# Patient Record
Sex: FEMALE | Race: WHITE | NOT HISPANIC OR LATINO | Employment: FULL TIME | URBAN - METROPOLITAN AREA
[De-identification: names, ages, dates, MRNs, and addresses within clinical notes are randomized per-mention and may not be internally consistent; named-entity substitution may affect disease eponyms.]

---

## 2017-04-10 RX ORDER — FLUOXETINE HYDROCHLORIDE 20 MG/1
60 CAPSULE ORAL DAILY
Qty: 90 CAPSULE | Refills: 0 | Status: SHIPPED | OUTPATIENT
Start: 2017-04-10 | End: 2017-09-27 | Stop reason: SDUPTHER

## 2017-09-27 ENCOUNTER — OFFICE VISIT (OUTPATIENT)
Dept: FAMILY MEDICINE | Facility: CLINIC | Age: 47
End: 2017-09-27
Payer: COMMERCIAL

## 2017-09-27 VITALS
HEIGHT: 64 IN | DIASTOLIC BLOOD PRESSURE: 69 MMHG | BODY MASS INDEX: 21 KG/M2 | SYSTOLIC BLOOD PRESSURE: 106 MMHG | WEIGHT: 123 LBS | HEART RATE: 70 BPM | TEMPERATURE: 98 F

## 2017-09-27 DIAGNOSIS — F32.A DEPRESSION, UNSPECIFIED DEPRESSION TYPE: Primary | ICD-10-CM

## 2017-09-27 DIAGNOSIS — F11.20 UNCOMPLICATED OPIOID DEPENDENCE: ICD-10-CM

## 2017-09-27 DIAGNOSIS — Z13.220 LIPID SCREENING: ICD-10-CM

## 2017-09-27 DIAGNOSIS — Z13.1 SCREENING FOR DIABETES MELLITUS: ICD-10-CM

## 2017-09-27 PROCEDURE — 3008F BODY MASS INDEX DOCD: CPT | Mod: S$GLB,,, | Performed by: FAMILY MEDICINE

## 2017-09-27 PROCEDURE — 99214 OFFICE O/P EST MOD 30 MIN: CPT | Mod: S$GLB,,, | Performed by: FAMILY MEDICINE

## 2017-09-27 PROCEDURE — 99999 PR PBB SHADOW E&M-EST. PATIENT-LVL III: CPT | Mod: PBBFAC,,, | Performed by: FAMILY MEDICINE

## 2017-09-27 RX ORDER — CARISOPRODOL 350 MG/1
350 TABLET ORAL DAILY
COMMUNITY
End: 2018-11-15

## 2017-09-27 RX ORDER — OXYCODONE HYDROCHLORIDE 60 MG/1
10 TABLET, FILM COATED, EXTENDED RELEASE ORAL 4 TIMES DAILY
COMMUNITY
End: 2018-11-15 | Stop reason: ALTCHOICE

## 2017-09-27 RX ORDER — ZOLPIDEM TARTRATE 10 MG/1
10 TABLET ORAL NIGHTLY PRN
COMMUNITY
End: 2020-05-25

## 2017-09-27 RX ORDER — FLUOXETINE HYDROCHLORIDE 20 MG/1
60 CAPSULE ORAL DAILY
Qty: 90 CAPSULE | Refills: 11 | Status: SHIPPED | OUTPATIENT
Start: 2017-09-27 | End: 2018-11-15 | Stop reason: SDUPTHER

## 2017-09-27 NOTE — PROGRESS NOTES
Followup on depression -request refill on Prozac. She has been on this for several years for depression symptoms. She has been doing well.. no suicidal or homicidal ideation. She does get some intermittent irritability and anxiety  around the time of her menstrual cycle, otherwise feels well.  Menstrual cycle seem to be spreading out, no intermenstrual bleeding.  Seeing GYN.  Some stress related to mother-in-law lives with them being ill.  She is on chronic pain medication from a pain management doctor due to neck problems, apparently starting to try to decrease medication.      Past Medical History:  Past Medical History:   Diagnosis Date    Allergy     Chronic neck pain     Depression     Kidney stone     Opioid dependence     Seen by pain management     Past Surgical History:   Procedure Laterality Date    LEEP       Social History     Social History    Marital status:      Spouse name: N/A    Number of children: N/A    Years of education: N/A     Occupational History    Not on file.     Social History Main Topics    Smoking status: Never Smoker    Smokeless tobacco: Not on file    Alcohol use 3.5 oz/week     7 drink(s) per week    Drug use: No    Sexual activity: Not on file     Other Topics Concern    Not on file     Social History Narrative    No narrative on file     No family history on file.  Allergies   Allergen Reactions    No Known Drug Allergies      Current Outpatient Prescriptions on File Prior to Visit   Medication Sig Dispense Refill    [DISCONTINUED] fluoxetine (PROZAC) 20 MG capsule Take 3 capsules (60 mg total) by mouth once daily. 90 capsule 0    [DISCONTINUED] carisoprodol (SOMA) 350 MG tablet Take 350 mg by mouth 4 (four) times daily as needed.       [DISCONTINUED] oxycodone (ROXICODONE) 30 MG immediate release tablet Take 30 mg by mouth every 6 (six) hours as needed. Three times a day      [DISCONTINUED] zolpidem (AMBIEN) 10 mg Tab Take 10 mg by mouth every  evening.        No current facility-administered medications on file prior to visit.            ROS:  GENERAL: No fever, chills,  or significant weight changes.  CHEST: Denies GLEASON, cyanosis, wheezing, cough and sputum production.  CARDIOVASCULAR: Denies chest pain, PND, orthopnea or reduced exercise tolerance.  ABDOMEN: Appetite fine. Denies diarrhea, abdominal pain, hematemesis or blood in stool.  PSYCHIATRIC: As above.  No SI/HI          OBJECTIVE:   Vitals:    09/27/17 1302   BP: 106/69   Pulse: 70   Temp: 97.9 °F (36.6 °C)     Wt Readings from Last 3 Encounters:   09/27/17 55.8 kg (123 lb)   04/01/16 58.2 kg (128 lb 6.4 oz)   01/30/15 58.5 kg (129 lb)         APPEARANCE: Well nourished, well developed, in no acute distress.    HEAD: Normocephalic.  Atraumatic.  No sinus tenderness.  EYES:   Right eye: Pupil reactive.  Conjunctiva clear.    Left eye: Pupil reactive.  Conjunctiva clear.    Both fundi:  Grossly normal to nondilated exam. EOMI.    EARS: TM's intact. Light reflex normal. No retraction or perforation.    NOSE:  clear.  MOUTH & THROAT:  No pharyngeal erythema or exudate. No lesions.  NECK: Supple. No bruits.  No JVD.  No cervical lymphadenopathy.  No thyromegaly.    CHEST: Breath sounds clear bilaterally.  Normal respiratory effort  CARDIOVASCULAR: Normal rate.  Regular rhythm.  No murmurs.  No rub.  No gallops.  ABDOMEN: Bowel sounds normal.  Soft.  No tenderness.  No organomegaly.  PERIPHERAL VASCULAR: No cyanosis.  No clubbing.  No edema.  NEUROLOGIC: No focal findings.  MENTAL STATUS: Alert.  Oriented x 3.      Kya was seen today for medication refill and anxiety.    Diagnoses and all orders for this visit:    Depression, unspecified depression type    Uncomplicated opioid dependence    Lipid screening  -     Cancel: Lipid panel; Future  -     Lipid panel; Future    Screening for diabetes mellitus  -     Cancel: Glucose, fasting; Future  -     Glucose, fasting; Future    Other orders  -      fluoxetine (PROZAC) 20 MG capsule; Take 3 capsules (60 mg total) by mouth once daily.      Continue current medication.  Keep follow-up pain management.  Would encourage decrease in opioid use.  Request report from last Pap smear and mammogram

## 2018-06-22 DIAGNOSIS — Z12.39 BREAST CANCER SCREENING: ICD-10-CM

## 2018-11-09 ENCOUNTER — TELEPHONE (OUTPATIENT)
Dept: FAMILY MEDICINE | Facility: CLINIC | Age: 48
End: 2018-11-09

## 2018-11-09 NOTE — TELEPHONE ENCOUNTER
Patient informed annual due, last seen 09/2017, appt scheduled, she will get records she needs when she comes in

## 2018-11-09 NOTE — TELEPHONE ENCOUNTER
----- Message from Jarrod Hopper sent at 11/9/2018 10:59 AM CST -----  Contact: self [phone]  Would like to consult with nurse regarding referral for neurology.  Please call back at [phone].  Africa Rodriguez

## 2018-11-15 ENCOUNTER — OFFICE VISIT (OUTPATIENT)
Dept: FAMILY MEDICINE | Facility: CLINIC | Age: 48
End: 2018-11-15
Payer: COMMERCIAL

## 2018-11-15 VITALS
WEIGHT: 126.81 LBS | HEIGHT: 64 IN | DIASTOLIC BLOOD PRESSURE: 80 MMHG | BODY MASS INDEX: 21.65 KG/M2 | TEMPERATURE: 98 F | SYSTOLIC BLOOD PRESSURE: 120 MMHG | HEART RATE: 80 BPM

## 2018-11-15 DIAGNOSIS — Z00.00 ROUTINE HISTORY AND PHYSICAL EXAMINATION OF ADULT: Primary | ICD-10-CM

## 2018-11-15 DIAGNOSIS — G89.29 CHRONIC NECK PAIN: ICD-10-CM

## 2018-11-15 DIAGNOSIS — M54.42 CHRONIC LOW BACK PAIN WITH LEFT-SIDED SCIATICA, UNSPECIFIED BACK PAIN LATERALITY: ICD-10-CM

## 2018-11-15 DIAGNOSIS — Z79.891 CHRONICALLY ON OPIATE THERAPY: ICD-10-CM

## 2018-11-15 DIAGNOSIS — M54.2 CHRONIC NECK PAIN: ICD-10-CM

## 2018-11-15 DIAGNOSIS — G89.29 CHRONIC LOW BACK PAIN WITH LEFT-SIDED SCIATICA, UNSPECIFIED BACK PAIN LATERALITY: ICD-10-CM

## 2018-11-15 DIAGNOSIS — F33.42 RECURRENT MAJOR DEPRESSIVE DISORDER, IN FULL REMISSION: ICD-10-CM

## 2018-11-15 PROBLEM — M54.9 CHRONIC BACK PAIN: Status: ACTIVE | Noted: 2018-11-15

## 2018-11-15 PROCEDURE — 99999 PR PBB SHADOW E&M-EST. PATIENT-LVL III: CPT | Mod: PBBFAC,,, | Performed by: FAMILY MEDICINE

## 2018-11-15 PROCEDURE — 99396 PREV VISIT EST AGE 40-64: CPT | Mod: S$GLB,,, | Performed by: FAMILY MEDICINE

## 2018-11-15 RX ORDER — OXYCODONE AND ACETAMINOPHEN 10; 325 MG/1; MG/1
1 TABLET ORAL 3 TIMES DAILY PRN
COMMUNITY
End: 2021-08-23

## 2018-11-15 RX ORDER — FLUOXETINE HYDROCHLORIDE 20 MG/1
60 CAPSULE ORAL DAILY
Qty: 90 CAPSULE | Refills: 11 | Status: SHIPPED | OUTPATIENT
Start: 2018-11-15 | End: 2020-01-14

## 2018-11-15 RX ORDER — OXYCODONE HCL 10 MG/1
10 TABLET, FILM COATED, EXTENDED RELEASE ORAL EVERY 12 HOURS PRN
COMMUNITY
End: 2020-05-25

## 2018-11-15 RX ORDER — ESTRADIOL 1 MG/1
1 TABLET ORAL DAILY
Refills: 6 | COMMUNITY
Start: 2018-10-12

## 2018-11-15 RX ORDER — MEDROXYPROGESTERONE ACETATE 2.5 MG/1
2.5 TABLET ORAL DAILY
COMMUNITY

## 2018-11-16 ENCOUNTER — TELEPHONE (OUTPATIENT)
Dept: FAMILY MEDICINE | Facility: CLINIC | Age: 48
End: 2018-11-16

## 2018-11-16 NOTE — TELEPHONE ENCOUNTER
----- Message from Jarrod Hopper sent at 11/15/2018  4:46 PM CST -----  Contact: self [phone]  Returning call, please call back at [phone].  Md [place]

## 2019-08-23 ENCOUNTER — PATIENT OUTREACH (OUTPATIENT)
Dept: ADMINISTRATIVE | Facility: HOSPITAL | Age: 49
End: 2019-08-23

## 2019-09-06 ENCOUNTER — PATIENT OUTREACH (OUTPATIENT)
Dept: ADMINISTRATIVE | Facility: HOSPITAL | Age: 49
End: 2019-09-06

## 2019-10-11 ENCOUNTER — PATIENT OUTREACH (OUTPATIENT)
Dept: ADMINISTRATIVE | Facility: HOSPITAL | Age: 49
End: 2019-10-11

## 2019-10-16 DIAGNOSIS — Z12.39 BREAST CANCER SCREENING: ICD-10-CM

## 2020-01-14 RX ORDER — FLUOXETINE HYDROCHLORIDE 20 MG/1
60 CAPSULE ORAL DAILY
Qty: 270 CAPSULE | Refills: 0 | Status: SHIPPED | OUTPATIENT
Start: 2020-01-14 | End: 2020-05-18

## 2020-05-18 RX ORDER — FLUOXETINE HYDROCHLORIDE 20 MG/1
CAPSULE ORAL
Qty: 270 CAPSULE | Refills: 0 | Status: SHIPPED | OUTPATIENT
Start: 2020-05-18 | End: 2020-05-25 | Stop reason: SDUPTHER

## 2020-05-25 ENCOUNTER — OFFICE VISIT (OUTPATIENT)
Dept: FAMILY MEDICINE | Facility: CLINIC | Age: 50
End: 2020-05-25
Payer: COMMERCIAL

## 2020-05-25 VITALS
SYSTOLIC BLOOD PRESSURE: 101 MMHG | BODY MASS INDEX: 22.66 KG/M2 | DIASTOLIC BLOOD PRESSURE: 62 MMHG | TEMPERATURE: 98 F | HEART RATE: 71 BPM | WEIGHT: 132 LBS

## 2020-05-25 DIAGNOSIS — Z11.4 ENCOUNTER FOR SCREENING FOR HIV: ICD-10-CM

## 2020-05-25 DIAGNOSIS — Z00.00 ROUTINE HISTORY AND PHYSICAL EXAMINATION OF ADULT: Primary | ICD-10-CM

## 2020-05-25 DIAGNOSIS — F33.42 RECURRENT MAJOR DEPRESSIVE DISORDER, IN FULL REMISSION: ICD-10-CM

## 2020-05-25 PROCEDURE — 99396 PR PREVENTIVE VISIT,EST,40-64: ICD-10-PCS | Mod: S$GLB,,, | Performed by: FAMILY MEDICINE

## 2020-05-25 PROCEDURE — 99396 PREV VISIT EST AGE 40-64: CPT | Mod: S$GLB,,, | Performed by: FAMILY MEDICINE

## 2020-05-25 PROCEDURE — 99999 PR PBB SHADOW E&M-EST. PATIENT-LVL III: CPT | Mod: PBBFAC,,, | Performed by: FAMILY MEDICINE

## 2020-05-25 PROCEDURE — 99999 PR PBB SHADOW E&M-EST. PATIENT-LVL III: ICD-10-PCS | Mod: PBBFAC,,, | Performed by: FAMILY MEDICINE

## 2020-05-25 RX ORDER — FLUOXETINE HYDROCHLORIDE 20 MG/1
60 CAPSULE ORAL DAILY
Qty: 270 CAPSULE | Refills: 3 | Status: SHIPPED | OUTPATIENT
Start: 2020-05-25 | End: 2021-07-23

## 2020-05-25 RX ORDER — CYCLOBENZAPRINE HCL 10 MG
TABLET ORAL
COMMUNITY
Start: 2020-04-20

## 2020-05-25 RX ORDER — OXYCODONE 13.5 MG/1
CAPSULE, EXTENDED RELEASE ORAL 2 TIMES DAILY
COMMUNITY
Start: 2020-05-18 | End: 2021-08-23

## 2020-05-25 RX ORDER — CELECOXIB 200 MG/1
CAPSULE ORAL
COMMUNITY
Start: 2020-04-20 | End: 2021-11-05

## 2020-05-26 NOTE — PROGRESS NOTES
Physical exam, Followup on depression -request refill on Prozac. She has been on this for several years for depression symptoms. She has been doing well.. no suicidal or homicidal ideation.  She is on hormone replacement therapy by her gynecologist.  States Pap smear up-to-date, mammogram up-to-date.   She is on chronic pain medication from a pain management doctor due to neck and back problems..      Past Medical History:  Past Medical History:   Diagnosis Date    Allergy     Chronic back pain 11/15/2018    Chronic neck pain     Chronically on opiate therapy     Depression     Kidney stone      Past Surgical History:   Procedure Laterality Date    LEEP       Social History     Socioeconomic History    Marital status:      Spouse name: Not on file    Number of children: Not on file    Years of education: Not on file    Highest education level: Not on file   Occupational History    Not on file   Social Needs    Financial resource strain: Not on file    Food insecurity:     Worry: Not on file     Inability: Not on file    Transportation needs:     Medical: Not on file     Non-medical: Not on file   Tobacco Use    Smoking status: Never Smoker   Substance and Sexual Activity    Alcohol use: Yes     Alcohol/week: 5.8 standard drinks     Types: 7 drink(s) per week    Drug use: No    Sexual activity: Not on file   Lifestyle    Physical activity:     Days per week: Not on file     Minutes per session: Not on file    Stress: Not on file   Relationships    Social connections:     Talks on phone: Not on file     Gets together: Not on file     Attends Tenriism service: Not on file     Active member of club or organization: Not on file     Attends meetings of clubs or organizations: Not on file     Relationship status: Not on file   Other Topics Concern    Not on file   Social History Narrative    Not on file     History reviewed. No pertinent family history.  Allergies   Allergen Reactions    No  Known Drug Allergies      Current Outpatient Medications on File Prior to Visit   Medication Sig Dispense Refill    estradiol (ESTRACE) 1 MG tablet Take 1 mg by mouth once daily.  6    medroxyPROGESTERone (PROVERA) 2.5 MG tablet Take 2.5 mg by mouth once daily.      oxyCODONE-acetaminophen (PERCOCET)  mg per tablet Take 1 tablet by mouth 3 (three) times daily as needed.      XTAMPZA ER 13.5 mg CSpT Take by mouth 2 (two) times daily.       [DISCONTINUED] FLUoxetine 20 MG capsule TAKE 3 CAPSULES BY MOUTH ONCE DAILY. 270 capsule 0    celecoxib (CELEBREX) 200 MG capsule       cyclobenzaprine (FLEXERIL) 10 MG tablet       [DISCONTINUED] oxyCODONE (OXYCONTIN) 10 mg 12 hr tablet Take 10 mg by mouth every 12 (twelve) hours as needed.      [DISCONTINUED] zolpidem (AMBIEN) 10 mg Tab Take 10 mg by mouth nightly as needed.       No current facility-administered medications on file prior to visit.            ROS:  GENERAL: No fever, chills,  or significant weight changes.  HEENT: No headache or hearing complaints.  No dysphagia  Eyes: No vision complaints  CHEST: Denies GLEASON, cyanosis, wheezing, cough and sputum production.  CARDIOVASCULAR: Denies chest pain, PND, orthopnea or reduced exercise tolerance.  ABDOMEN: Appetite fine. Denies diarrhea, abdominal pain, hematemesis or blood in stool.  URINARY: No flank pain, dysuria or hematuria.  MUSCULOSKELETAL: No warmth swelling or tenderness of the joints.  She does get some neck and back pain.  Occasional radiation to the leg.  NEUROLOGIC: No focal weakness.  PSYCHIATRIC: As above.  No SI/HI        OBJECTIVE:     Vitals:    05/25/20 0926   BP: 101/62   Pulse: 71   Temp: 98.3 °F (36.8 °C)   Weight: 59.9 kg (132 lb)     Wt Readings from Last 3 Encounters:   05/25/20 59.9 kg (132 lb)   11/15/18 57.5 kg (126 lb 12.8 oz)   09/27/17 55.8 kg (123 lb)     APPEARANCE: Well nourished, well developed, in no acute distress.    HEAD: Normocephalic.  Atraumatic.  No sinus  tenderness.  EYES:   Right eye: Pupil reactive.  Conjunctiva clear.    Left eye: Pupil reactive.  Conjunctiva clear.    Both fundi:  Grossly normal to nondilated exam. EOMI.    EARS: TM's intact. Light reflex normal. No retraction or perforation.    NOSE:  clear.  MOUTH & THROAT:  No pharyngeal erythema or exudate. No lesions.  NECK: Supple. No bruits.  No JVD.  No cervical lymphadenopathy.  No thyromegaly.    CHEST: Breath sounds clear bilaterally.  Normal respiratory effort  CARDIOVASCULAR: Normal rate.  Regular rhythm.  No murmurs.  No rub.  No gallops.  ABDOMEN: Bowel sounds normal.  Soft.  No tenderness.  No organomegaly.  PERIPHERAL VASCULAR: No cyanosis.  No clubbing.  No edema.  NEUROLOGIC: No focal findings.  MENTAL STATUS: Alert.  Oriented x 3.        Kya was seen today for annual exam.    Diagnoses and all orders for this visit:    Routine history and physical examination of adult  -     CBC auto differential; Future  -     Lipid Panel; Future  -     Comprehensive metabolic panel; Future  -     CBC auto differential  -     Lipid Panel  -     Comprehensive metabolic panel  -     Cancel: HIV 1/2 Ag/Ab (4th Gen); Future  -     HIV 1/2 Ag/Ab (4th Gen); Future  -     HIV 1/2 Ag/Ab (4th Gen)    Recurrent major depressive disorder, in full remission    Encounter for screening for HIV  -     Cancel: HIV 1/2 Ag/Ab (4th Gen); Future  -     HIV 1/2 Ag/Ab (4th Gen); Future  -     HIV 1/2 Ag/Ab (4th Gen)    Other orders  -     FLUoxetine 20 MG capsule; Take 3 capsules (60 mg total) by mouth once daily.      Continue current medication.  Request report from last Pap smear and mammogram.      Anticipatory guidance: Don't smoke.  Healthy diet and regular exercise recommended.

## 2020-12-16 DIAGNOSIS — Z12.31 OTHER SCREENING MAMMOGRAM: ICD-10-CM

## 2021-05-28 ENCOUNTER — TELEPHONE (OUTPATIENT)
Dept: FAMILY MEDICINE | Facility: CLINIC | Age: 51
End: 2021-05-28

## 2021-05-31 ENCOUNTER — TELEPHONE (OUTPATIENT)
Dept: FAMILY MEDICINE | Facility: CLINIC | Age: 51
End: 2021-05-31

## 2021-07-23 RX ORDER — FLUOXETINE HYDROCHLORIDE 20 MG/1
CAPSULE ORAL
Qty: 270 CAPSULE | Refills: 0 | Status: SHIPPED | OUTPATIENT
Start: 2021-07-23 | End: 2021-08-23 | Stop reason: SDUPTHER

## 2021-08-13 ENCOUNTER — TELEPHONE (OUTPATIENT)
Dept: FAMILY MEDICINE | Facility: CLINIC | Age: 51
End: 2021-08-13

## 2021-08-23 ENCOUNTER — OFFICE VISIT (OUTPATIENT)
Dept: FAMILY MEDICINE | Facility: CLINIC | Age: 51
End: 2021-08-23
Payer: COMMERCIAL

## 2021-08-23 VITALS
HEIGHT: 64 IN | SYSTOLIC BLOOD PRESSURE: 103 MMHG | BODY MASS INDEX: 22.02 KG/M2 | WEIGHT: 129 LBS | HEART RATE: 65 BPM | OXYGEN SATURATION: 96 % | TEMPERATURE: 98 F | DIASTOLIC BLOOD PRESSURE: 72 MMHG

## 2021-08-23 DIAGNOSIS — F33.42 RECURRENT MAJOR DEPRESSIVE DISORDER, IN FULL REMISSION: ICD-10-CM

## 2021-08-23 DIAGNOSIS — Z00.00 ROUTINE HISTORY AND PHYSICAL EXAMINATION OF ADULT: Primary | ICD-10-CM

## 2021-08-23 DIAGNOSIS — Z11.59 NEED FOR HEPATITIS C SCREENING TEST: ICD-10-CM

## 2021-08-23 DIAGNOSIS — Z11.4 SCREENING FOR HIV (HUMAN IMMUNODEFICIENCY VIRUS): ICD-10-CM

## 2021-08-23 DIAGNOSIS — Z12.11 SCREENING FOR COLON CANCER: ICD-10-CM

## 2021-08-23 PROCEDURE — 1126F AMNT PAIN NOTED NONE PRSNT: CPT | Mod: CPTII,S$GLB,, | Performed by: FAMILY MEDICINE

## 2021-08-23 PROCEDURE — 1159F MED LIST DOCD IN RCRD: CPT | Mod: CPTII,S$GLB,, | Performed by: FAMILY MEDICINE

## 2021-08-23 PROCEDURE — 1159F PR MEDICATION LIST DOCUMENTED IN MEDICAL RECORD: ICD-10-PCS | Mod: CPTII,S$GLB,, | Performed by: FAMILY MEDICINE

## 2021-08-23 PROCEDURE — 3078F PR MOST RECENT DIASTOLIC BLOOD PRESSURE < 80 MM HG: ICD-10-PCS | Mod: CPTII,S$GLB,, | Performed by: FAMILY MEDICINE

## 2021-08-23 PROCEDURE — 90750 HZV VACC RECOMBINANT IM: CPT | Mod: S$GLB,,, | Performed by: FAMILY MEDICINE

## 2021-08-23 PROCEDURE — 90471 ZOSTER RECOMBINANT VACCINE: ICD-10-PCS | Mod: S$GLB,,, | Performed by: FAMILY MEDICINE

## 2021-08-23 PROCEDURE — 99999 PR PBB SHADOW E&M-EST. PATIENT-LVL III: ICD-10-PCS | Mod: PBBFAC,,, | Performed by: FAMILY MEDICINE

## 2021-08-23 PROCEDURE — 99396 PREV VISIT EST AGE 40-64: CPT | Mod: 25,S$GLB,, | Performed by: FAMILY MEDICINE

## 2021-08-23 PROCEDURE — 3074F PR MOST RECENT SYSTOLIC BLOOD PRESSURE < 130 MM HG: ICD-10-PCS | Mod: CPTII,S$GLB,, | Performed by: FAMILY MEDICINE

## 2021-08-23 PROCEDURE — 99396 PR PREVENTIVE VISIT,EST,40-64: ICD-10-PCS | Mod: 25,S$GLB,, | Performed by: FAMILY MEDICINE

## 2021-08-23 PROCEDURE — 90471 IMMUNIZATION ADMIN: CPT | Mod: S$GLB,,, | Performed by: FAMILY MEDICINE

## 2021-08-23 PROCEDURE — 3008F PR BODY MASS INDEX (BMI) DOCUMENTED: ICD-10-PCS | Mod: CPTII,S$GLB,, | Performed by: FAMILY MEDICINE

## 2021-08-23 PROCEDURE — 3074F SYST BP LT 130 MM HG: CPT | Mod: CPTII,S$GLB,, | Performed by: FAMILY MEDICINE

## 2021-08-23 PROCEDURE — 99999 PR PBB SHADOW E&M-EST. PATIENT-LVL III: CPT | Mod: PBBFAC,,, | Performed by: FAMILY MEDICINE

## 2021-08-23 PROCEDURE — 1126F PR PAIN SEVERITY QUANTIFIED, NO PAIN PRESENT: ICD-10-PCS | Mod: CPTII,S$GLB,, | Performed by: FAMILY MEDICINE

## 2021-08-23 PROCEDURE — 3008F BODY MASS INDEX DOCD: CPT | Mod: CPTII,S$GLB,, | Performed by: FAMILY MEDICINE

## 2021-08-23 PROCEDURE — 3078F DIAST BP <80 MM HG: CPT | Mod: CPTII,S$GLB,, | Performed by: FAMILY MEDICINE

## 2021-08-23 PROCEDURE — 90750 ZOSTER RECOMBINANT VACCINE: ICD-10-PCS | Mod: S$GLB,,, | Performed by: FAMILY MEDICINE

## 2021-08-23 RX ORDER — ZOLPIDEM TARTRATE 10 MG/1
10 TABLET ORAL NIGHTLY
COMMUNITY
Start: 2021-07-19 | End: 2021-08-23 | Stop reason: SDUPTHER

## 2021-08-23 RX ORDER — ZOLPIDEM TARTRATE 5 MG/1
5 TABLET ORAL NIGHTLY PRN
Qty: 30 TABLET | Refills: 5 | Status: SHIPPED | OUTPATIENT
Start: 2021-08-23 | End: 2022-02-08 | Stop reason: SDUPTHER

## 2021-08-23 RX ORDER — OXYCODONE HYDROCHLORIDE 20 MG/1
TABLET, FILM COATED, EXTENDED RELEASE ORAL
COMMUNITY
Start: 2021-05-13 | End: 2021-08-23

## 2021-08-23 RX ORDER — FLUOXETINE HYDROCHLORIDE 20 MG/1
60 CAPSULE ORAL DAILY
Qty: 270 CAPSULE | Refills: 3 | Status: SHIPPED | OUTPATIENT
Start: 2021-08-23 | End: 2022-09-24

## 2021-09-30 ENCOUNTER — PATIENT OUTREACH (OUTPATIENT)
Dept: ADMINISTRATIVE | Facility: HOSPITAL | Age: 51
End: 2021-09-30

## 2021-10-14 ENCOUNTER — PATIENT OUTREACH (OUTPATIENT)
Dept: ADMINISTRATIVE | Facility: HOSPITAL | Age: 51
End: 2021-10-14

## 2021-10-25 ENCOUNTER — TELEPHONE (OUTPATIENT)
Dept: FAMILY MEDICINE | Facility: CLINIC | Age: 51
End: 2021-10-25
Payer: COMMERCIAL

## 2021-10-29 ENCOUNTER — TELEPHONE (OUTPATIENT)
Dept: FAMILY MEDICINE | Facility: CLINIC | Age: 51
End: 2021-10-29

## 2021-10-29 ENCOUNTER — CLINICAL SUPPORT (OUTPATIENT)
Dept: FAMILY MEDICINE | Facility: CLINIC | Age: 51
End: 2021-10-29
Payer: COMMERCIAL

## 2021-10-29 DIAGNOSIS — Z23 IMMUNIZATION DUE: Primary | ICD-10-CM

## 2021-10-29 PROCEDURE — 99999 PR PBB SHADOW E&M-EST. PATIENT-LVL I: CPT | Mod: PBBFAC,,,

## 2021-10-29 PROCEDURE — 90471 ZOSTER RECOMBINANT VACCINE: ICD-10-PCS | Mod: S$GLB,,, | Performed by: FAMILY MEDICINE

## 2021-10-29 PROCEDURE — 90471 IMMUNIZATION ADMIN: CPT | Mod: S$GLB,,, | Performed by: FAMILY MEDICINE

## 2021-10-29 PROCEDURE — 90750 HZV VACC RECOMBINANT IM: CPT | Mod: S$GLB,,, | Performed by: FAMILY MEDICINE

## 2021-10-29 PROCEDURE — 90750 ZOSTER RECOMBINANT VACCINE: ICD-10-PCS | Mod: S$GLB,,, | Performed by: FAMILY MEDICINE

## 2021-10-29 PROCEDURE — 99999 PR PBB SHADOW E&M-EST. PATIENT-LVL I: ICD-10-PCS | Mod: PBBFAC,,,

## 2021-11-05 ENCOUNTER — OFFICE VISIT (OUTPATIENT)
Dept: FAMILY MEDICINE | Facility: CLINIC | Age: 51
End: 2021-11-05
Payer: COMMERCIAL

## 2021-11-05 VITALS
TEMPERATURE: 98 F | HEIGHT: 65 IN | SYSTOLIC BLOOD PRESSURE: 111 MMHG | BODY MASS INDEX: 21.49 KG/M2 | HEART RATE: 72 BPM | WEIGHT: 129 LBS | DIASTOLIC BLOOD PRESSURE: 69 MMHG

## 2021-11-05 DIAGNOSIS — Z11.59 NEED FOR HEPATITIS C SCREENING TEST: ICD-10-CM

## 2021-11-05 DIAGNOSIS — Z11.4 SCREENING FOR HIV (HUMAN IMMUNODEFICIENCY VIRUS): ICD-10-CM

## 2021-11-05 DIAGNOSIS — L65.9 HAIR LOSS: Primary | ICD-10-CM

## 2021-11-05 DIAGNOSIS — F33.42 RECURRENT MAJOR DEPRESSIVE DISORDER, IN FULL REMISSION: ICD-10-CM

## 2021-11-05 DIAGNOSIS — Z00.00 ROUTINE HISTORY AND PHYSICAL EXAMINATION OF ADULT: ICD-10-CM

## 2021-11-05 PROCEDURE — 3074F PR MOST RECENT SYSTOLIC BLOOD PRESSURE < 130 MM HG: ICD-10-PCS | Mod: CPTII,S$GLB,, | Performed by: FAMILY MEDICINE

## 2021-11-05 PROCEDURE — 99214 PR OFFICE/OUTPT VISIT, EST, LEVL IV, 30-39 MIN: ICD-10-PCS | Mod: S$GLB,,, | Performed by: FAMILY MEDICINE

## 2021-11-05 PROCEDURE — 3008F PR BODY MASS INDEX (BMI) DOCUMENTED: ICD-10-PCS | Mod: CPTII,S$GLB,, | Performed by: FAMILY MEDICINE

## 2021-11-05 PROCEDURE — 99214 OFFICE O/P EST MOD 30 MIN: CPT | Mod: S$GLB,,, | Performed by: FAMILY MEDICINE

## 2021-11-05 PROCEDURE — 3008F BODY MASS INDEX DOCD: CPT | Mod: CPTII,S$GLB,, | Performed by: FAMILY MEDICINE

## 2021-11-05 PROCEDURE — 3078F DIAST BP <80 MM HG: CPT | Mod: CPTII,S$GLB,, | Performed by: FAMILY MEDICINE

## 2021-11-05 PROCEDURE — 1159F PR MEDICATION LIST DOCUMENTED IN MEDICAL RECORD: ICD-10-PCS | Mod: CPTII,S$GLB,, | Performed by: FAMILY MEDICINE

## 2021-11-05 PROCEDURE — 3078F PR MOST RECENT DIASTOLIC BLOOD PRESSURE < 80 MM HG: ICD-10-PCS | Mod: CPTII,S$GLB,, | Performed by: FAMILY MEDICINE

## 2021-11-05 PROCEDURE — 1159F MED LIST DOCD IN RCRD: CPT | Mod: CPTII,S$GLB,, | Performed by: FAMILY MEDICINE

## 2021-11-05 PROCEDURE — 99999 PR PBB SHADOW E&M-EST. PATIENT-LVL III: ICD-10-PCS | Mod: PBBFAC,,, | Performed by: FAMILY MEDICINE

## 2021-11-05 PROCEDURE — 3074F SYST BP LT 130 MM HG: CPT | Mod: CPTII,S$GLB,, | Performed by: FAMILY MEDICINE

## 2021-11-05 PROCEDURE — 99999 PR PBB SHADOW E&M-EST. PATIENT-LVL III: CPT | Mod: PBBFAC,,, | Performed by: FAMILY MEDICINE

## 2021-11-05 RX ORDER — FLUCONAZOLE 150 MG/1
150 TABLET ORAL ONCE
Qty: 1 TABLET | Refills: 0 | Status: SHIPPED | OUTPATIENT
Start: 2021-11-05 | End: 2021-11-05

## 2021-11-06 ENCOUNTER — LAB VISIT (OUTPATIENT)
Dept: LAB | Facility: HOSPITAL | Age: 51
End: 2021-11-06
Attending: FAMILY MEDICINE
Payer: COMMERCIAL

## 2021-11-06 DIAGNOSIS — Z11.59 NEED FOR HEPATITIS C SCREENING TEST: ICD-10-CM

## 2021-11-06 DIAGNOSIS — Z11.4 SCREENING FOR HIV (HUMAN IMMUNODEFICIENCY VIRUS): ICD-10-CM

## 2021-11-06 DIAGNOSIS — L65.9 HAIR LOSS: ICD-10-CM

## 2021-11-06 DIAGNOSIS — Z00.00 ROUTINE HISTORY AND PHYSICAL EXAMINATION OF ADULT: ICD-10-CM

## 2021-11-06 LAB
ALBUMIN SERPL BCP-MCNC: 3.8 G/DL (ref 3.5–5.2)
ALP SERPL-CCNC: 78 U/L (ref 55–135)
ALT SERPL W/O P-5'-P-CCNC: 15 U/L (ref 10–44)
ANION GAP SERPL CALC-SCNC: 8 MMOL/L (ref 8–16)
AST SERPL-CCNC: 24 U/L (ref 10–40)
BASOPHILS # BLD AUTO: 0.03 K/UL (ref 0–0.2)
BASOPHILS NFR BLD: 0.5 % (ref 0–1.9)
BILIRUB SERPL-MCNC: 0.7 MG/DL (ref 0.1–1)
BUN SERPL-MCNC: 8 MG/DL (ref 6–20)
CALCIUM SERPL-MCNC: 9.6 MG/DL (ref 8.7–10.5)
CHLORIDE SERPL-SCNC: 104 MMOL/L (ref 95–110)
CHOLEST SERPL-MCNC: 152 MG/DL (ref 120–199)
CHOLEST/HDLC SERPL: 2.3 {RATIO} (ref 2–5)
CO2 SERPL-SCNC: 30 MMOL/L (ref 23–29)
CREAT SERPL-MCNC: 0.8 MG/DL (ref 0.5–1.4)
DIFFERENTIAL METHOD: NORMAL
EOSINOPHIL # BLD AUTO: 0.2 K/UL (ref 0–0.5)
EOSINOPHIL NFR BLD: 3.1 % (ref 0–8)
ERYTHROCYTE [DISTWIDTH] IN BLOOD BY AUTOMATED COUNT: 13 % (ref 11.5–14.5)
EST. GFR  (AFRICAN AMERICAN): >60 ML/MIN/1.73 M^2
EST. GFR  (NON AFRICAN AMERICAN): >60 ML/MIN/1.73 M^2
GLUCOSE SERPL-MCNC: 88 MG/DL (ref 70–110)
HCT VFR BLD AUTO: 39.2 % (ref 37–48.5)
HDLC SERPL-MCNC: 67 MG/DL (ref 40–75)
HDLC SERPL: 44.1 % (ref 20–50)
HGB BLD-MCNC: 12.7 G/DL (ref 12–16)
IMM GRANULOCYTES # BLD AUTO: 0.02 K/UL (ref 0–0.04)
IMM GRANULOCYTES NFR BLD AUTO: 0.4 % (ref 0–0.5)
LDLC SERPL CALC-MCNC: 77 MG/DL (ref 63–159)
LYMPHOCYTES # BLD AUTO: 1.5 K/UL (ref 1–4.8)
LYMPHOCYTES NFR BLD: 26.6 % (ref 18–48)
MCH RBC QN AUTO: 30.2 PG (ref 27–31)
MCHC RBC AUTO-ENTMCNC: 32.4 G/DL (ref 32–36)
MCV RBC AUTO: 93 FL (ref 82–98)
MONOCYTES # BLD AUTO: 0.4 K/UL (ref 0.3–1)
MONOCYTES NFR BLD: 6.6 % (ref 4–15)
NEUTROPHILS # BLD AUTO: 3.5 K/UL (ref 1.8–7.7)
NEUTROPHILS NFR BLD: 62.8 % (ref 38–73)
NONHDLC SERPL-MCNC: 85 MG/DL
NRBC BLD-RTO: 0 /100 WBC
PLATELET # BLD AUTO: 306 K/UL (ref 150–450)
PMV BLD AUTO: 9.3 FL (ref 9.2–12.9)
POTASSIUM SERPL-SCNC: 4.2 MMOL/L (ref 3.5–5.1)
PROT SERPL-MCNC: 7.2 G/DL (ref 6–8.4)
RBC # BLD AUTO: 4.21 M/UL (ref 4–5.4)
SODIUM SERPL-SCNC: 142 MMOL/L (ref 136–145)
TRIGL SERPL-MCNC: 40 MG/DL (ref 30–150)
TSH SERPL DL<=0.005 MIU/L-ACNC: 1.6 UIU/ML (ref 0.4–4)
VIT B12 SERPL-MCNC: 635 PG/ML (ref 210–950)
WBC # BLD AUTO: 5.57 K/UL (ref 3.9–12.7)

## 2021-11-06 PROCEDURE — 82607 VITAMIN B-12: CPT | Performed by: FAMILY MEDICINE

## 2021-11-06 PROCEDURE — 84443 ASSAY THYROID STIM HORMONE: CPT | Performed by: FAMILY MEDICINE

## 2021-11-06 PROCEDURE — 87389 HIV-1 AG W/HIV-1&-2 AB AG IA: CPT | Performed by: FAMILY MEDICINE

## 2021-11-06 PROCEDURE — 85025 COMPLETE CBC W/AUTO DIFF WBC: CPT | Performed by: FAMILY MEDICINE

## 2021-11-06 PROCEDURE — 80061 LIPID PANEL: CPT | Performed by: FAMILY MEDICINE

## 2021-11-06 PROCEDURE — 36415 COLL VENOUS BLD VENIPUNCTURE: CPT | Mod: PO | Performed by: FAMILY MEDICINE

## 2021-11-06 PROCEDURE — 86803 HEPATITIS C AB TEST: CPT | Performed by: FAMILY MEDICINE

## 2021-11-06 PROCEDURE — 80053 COMPREHEN METABOLIC PANEL: CPT | Performed by: FAMILY MEDICINE

## 2021-11-08 LAB
HCV AB SERPL QL IA: NEGATIVE
HIV 1+2 AB+HIV1 P24 AG SERPL QL IA: NEGATIVE

## 2022-01-27 RX ORDER — ZOLPIDEM TARTRATE 5 MG/1
5 TABLET ORAL NIGHTLY PRN
Qty: 30 TABLET | Refills: 5 | OUTPATIENT
Start: 2022-01-27

## 2022-01-27 NOTE — TELEPHONE ENCOUNTER
----- Message from Heidi Dupont sent at 1/27/2022  3:35 PM CST -----  Contact: Patient @ [phone]  Requesting an RX refill or new RX.  Is this a refill or new RX:   RX name and strength zolpidem (AMBIEN) 5 MG Tab  Is this a 30 day or 90 day RX:   Patient advised that in the future they can use their [facility] account to request a refill?:  yes  Patient advised that RX refills can take up to 72 hours?:  yes  Pharmacy name and phone # [id number] [place] - [address]  Comments:

## 2022-01-27 NOTE — TELEPHONE ENCOUNTER
No new care gaps identified.  Powered by [employer]. Reference number: [id number].   1/27/2022 4:01:39 PM CST   97.2

## 2022-02-08 RX ORDER — ZOLPIDEM TARTRATE 5 MG/1
5 TABLET ORAL NIGHTLY PRN
Qty: 30 TABLET | Refills: 2 | Status: SHIPPED | OUTPATIENT
Start: 2022-02-08 | End: 2022-05-04 | Stop reason: SDUPTHER

## 2022-02-08 NOTE — TELEPHONE ENCOUNTER
No new care gaps identified.  Powered by [employer]. Reference number: [id number].   2/08/2022 3:58:04 PM CST

## 2022-02-08 NOTE — TELEPHONE ENCOUNTER
----- Message from Mariah Cobos sent at 2/8/2022  3:44 PM CST -----  Contact: Patient  Type:  RX Refill Request    Who Called:  Patient   Refill or New Rx: refill  RX Name and Strength: Ambien  How is the patient currently taking it? (ex. 1XDay): 1xday  Is this a 30 day or 90 day RX: 30  Preferred Pharmacy with phone number:   [id number] - [place], [place] - [address]  [address] [address]  Phone: [phone] Fax: [phone]  Local or Mail Order: Local  Ordering Provider: Dr. Tobin  Would the patient rather a call back or a response via [facility]? Call back  Best Call Back Number: Please call her at [phone]  Additional Information:

## 2022-02-16 DIAGNOSIS — Z12.31 OTHER SCREENING MAMMOGRAM: ICD-10-CM

## 2022-05-02 NOTE — TELEPHONE ENCOUNTER
----- Message from Urszula Diaz sent at 5/2/2022  3:48 PM CDT -----  Contact: self  Type:  RX Refill Request    Who Called: Kya Avitia   Refill or New Rx: refill   RX Name and Strength: zolpidem (AMBIEN) 5 MG Tab   How is the patient currently taking it? (ex. 1XDay): 1X daily   Is this a 30 day or 90 day RX: 30 day   Preferred Pharmacy with phone number:   [id number] - [place], [place] - [address]  [address] [address]  Phone: [phone] Fax: [phone]  Local or Mail Order: Local   Ordering Provider: Crista   Would the patient rather a call back or a response via [facility]?  Call back   Best Call Back Number: [phone]   Additional Information:

## 2022-05-02 NOTE — TELEPHONE ENCOUNTER
No new care gaps identified.  Powered by [employer]. Reference number: [id number].   5/02/2022 4:32:24 PM CDT

## 2022-05-03 RX ORDER — ZOLPIDEM TARTRATE 5 MG/1
5 TABLET ORAL NIGHTLY PRN
Qty: 30 TABLET | Refills: 2 | OUTPATIENT
Start: 2022-05-03

## 2022-05-04 RX ORDER — ZOLPIDEM TARTRATE 5 MG/1
5 TABLET ORAL NIGHTLY PRN
Qty: 30 TABLET | Refills: 0 | Status: SHIPPED | OUTPATIENT
Start: 2022-05-04 | End: 2022-05-23 | Stop reason: SDUPTHER

## 2022-05-04 NOTE — TELEPHONE ENCOUNTER
Pt was advised that Ambien was refilled . She said that she will keep her appt w/ Dr Tobin at the end of the month .

## 2022-05-04 NOTE — TELEPHONE ENCOUNTER
I sent in prescription.  Please make sure that she keeps the follow-up appointment.  Also if she gets signed up on [facility] we could probably do a video visit prior to this.

## 2022-05-04 NOTE — TELEPHONE ENCOUNTER
Spoke with patient, she is unable to come to clinic until 5/23/2022.  I scheduled her the appointment and she wants to know if you will send in enough medication for her to make it to that appointment. Please advise.

## 2022-05-23 ENCOUNTER — OFFICE VISIT (OUTPATIENT)
Dept: FAMILY MEDICINE | Facility: CLINIC | Age: 52
End: 2022-05-23
Payer: COMMERCIAL

## 2022-05-23 VITALS
TEMPERATURE: 98 F | BODY MASS INDEX: 22.22 KG/M2 | SYSTOLIC BLOOD PRESSURE: 100 MMHG | WEIGHT: 130.13 LBS | DIASTOLIC BLOOD PRESSURE: 78 MMHG | HEIGHT: 64 IN | OXYGEN SATURATION: 99 % | HEART RATE: 70 BPM

## 2022-05-23 DIAGNOSIS — F33.42 RECURRENT MAJOR DEPRESSIVE DISORDER, IN FULL REMISSION: Primary | ICD-10-CM

## 2022-05-23 DIAGNOSIS — F51.04 CHRONIC INSOMNIA: ICD-10-CM

## 2022-05-23 DIAGNOSIS — Z12.11 SCREENING FOR COLON CANCER: ICD-10-CM

## 2022-05-23 DIAGNOSIS — Z12.31 ENCOUNTER FOR SCREENING MAMMOGRAM FOR MALIGNANT NEOPLASM OF BREAST: ICD-10-CM

## 2022-05-23 PROCEDURE — 99999 PR PBB SHADOW E&M-EST. PATIENT-LVL IV: ICD-10-PCS | Mod: PBBFAC,,, | Performed by: FAMILY MEDICINE

## 2022-05-23 PROCEDURE — 3078F PR MOST RECENT DIASTOLIC BLOOD PRESSURE < 80 MM HG: ICD-10-PCS | Mod: CPTII,S$GLB,, | Performed by: FAMILY MEDICINE

## 2022-05-23 PROCEDURE — 99213 OFFICE O/P EST LOW 20 MIN: CPT | Mod: S$GLB,,, | Performed by: FAMILY MEDICINE

## 2022-05-23 PROCEDURE — 3074F SYST BP LT 130 MM HG: CPT | Mod: CPTII,S$GLB,, | Performed by: FAMILY MEDICINE

## 2022-05-23 PROCEDURE — 99213 PR OFFICE/OUTPT VISIT, EST, LEVL III, 20-29 MIN: ICD-10-PCS | Mod: S$GLB,,, | Performed by: FAMILY MEDICINE

## 2022-05-23 PROCEDURE — 1159F MED LIST DOCD IN RCRD: CPT | Mod: CPTII,S$GLB,, | Performed by: FAMILY MEDICINE

## 2022-05-23 PROCEDURE — 3008F PR BODY MASS INDEX (BMI) DOCUMENTED: ICD-10-PCS | Mod: CPTII,S$GLB,, | Performed by: FAMILY MEDICINE

## 2022-05-23 PROCEDURE — 99999 PR PBB SHADOW E&M-EST. PATIENT-LVL IV: CPT | Mod: PBBFAC,,, | Performed by: FAMILY MEDICINE

## 2022-05-23 PROCEDURE — 3078F DIAST BP <80 MM HG: CPT | Mod: CPTII,S$GLB,, | Performed by: FAMILY MEDICINE

## 2022-05-23 PROCEDURE — 3074F PR MOST RECENT SYSTOLIC BLOOD PRESSURE < 130 MM HG: ICD-10-PCS | Mod: CPTII,S$GLB,, | Performed by: FAMILY MEDICINE

## 2022-05-23 PROCEDURE — 1159F PR MEDICATION LIST DOCUMENTED IN MEDICAL RECORD: ICD-10-PCS | Mod: CPTII,S$GLB,, | Performed by: FAMILY MEDICINE

## 2022-05-23 PROCEDURE — 3008F BODY MASS INDEX DOCD: CPT | Mod: CPTII,S$GLB,, | Performed by: FAMILY MEDICINE

## 2022-05-23 RX ORDER — ZOLPIDEM TARTRATE 5 MG/1
5 TABLET ORAL NIGHTLY PRN
Qty: 30 TABLET | Refills: 5 | Status: SHIPPED | OUTPATIENT
Start: 2022-06-03

## 2022-05-24 NOTE — PROGRESS NOTES
Patient presents follow-up depression insomnia.  Doing well with current medication needs refills.  No SI/HI.    Kya was seen today for medication refill.    Diagnoses and all orders for this visit:    Recurrent major depressive disorder, in full remission    Chronic insomnia    Screening for colon cancer  -     Cologuard Screening (Multitarget Stool DNA); Future  -     Cologuard Screening (Multitarget Stool DNA)    Encounter for screening mammogram for malignant neoplasm of breast  -     Mammo Digital Screening Bilat w/ Kevin; Future    Other orders  -     zolpidem (AMBIEN) 5 MG Tab; Take 1 tablet (5 mg total) by mouth nightly as needed (insomnia).    Continue current medication.              Past Medical History:  Past Medical History:   Diagnosis Date    Allergy     Chronic back pain 11/15/2018    Chronic neck pain     Chronically on opiate therapy     Depression     Kidney stone      Past Surgical History:   Procedure Laterality Date    LEEP       Review of patient's allergies indicates:   Allergen Reactions    No known drug allergies      Current Outpatient Medications on File Prior to Visit   Medication Sig Dispense Refill    cyclobenzaprine (FLEXERIL) 10 MG tablet Uses prn      estradiol (ESTRACE) 1 MG tablet Take 1 mg by mouth once daily.  6    FLUoxetine 20 MG capsule Take 3 capsules (60 mg total) by mouth once daily. 270 capsule 3    medroxyPROGESTERone (PROVERA) 2.5 MG tablet Take 2.5 mg by mouth once daily.      [DISCONTINUED] zolpidem (AMBIEN) 5 MG Tab Take 1 tablet (5 mg total) by mouth nightly as needed (insomnia). 30 tablet 0     No current facility-administered medications on file prior to visit.     Social History     Socioeconomic History    Marital status:    Tobacco Use    Smoking status: Never Smoker    Smokeless tobacco: Never Used   Substance and Sexual Activity    Alcohol use: Yes     Alcohol/week: 5.8 standard drinks     Types: 7 Standard drinks or equivalent per  "week    Drug use: No     History reviewed. No pertinent family history.        ROS:  GENERAL: No fever, chills,  or significant weight changes.   CARDIOVASCULAR: Denies chest pain, PND, orthopnea or reduced exercise tolerance.  ABDOMEN: Appetite fine. Denies diarrhea, abdominal pain, hematemesis or blood in stool.  URINARY: No flank pain, dysuria or hematuria.    Vitals:    05/23/22 1547   BP: 100/78   Pulse: 70   Temp: 97.6 °F (36.4 °C)   SpO2: 99%   Weight: 59 kg (130 lb 1.6 oz)   Height: 5' 4" (1.626 m)       Wt Readings from Last 3 Encounters:   05/23/22 59 kg (130 lb 1.6 oz)   11/05/21 58.5 kg (129 lb)   08/23/21 58.5 kg (129 lb)       APPEARANCE: Well nourished, well developed, in no acute distress.    HEAD: Normocephalic.  Atraumatic.  EYES:   Right eye: Pupil reactive.  Conjunctiva clear.    Left eye: Pupil reactive.  Conjunctiva clear.    NECK: Supple. No bruits.  No JVD.  No cervical lymphadenopathy.  No thyromegaly.    CHEST: Breath sounds clear bilaterally.  Normal respiratory effort  CARDIOVASCULAR: Normal rate.  Regular rhythm.  No murmurs.  No rub.  No gallops.   No edema.  MENTAL STATUS: Alert.  Oriented x 3.  "

## 2022-05-31 ENCOUNTER — HOSPITAL ENCOUNTER (OUTPATIENT)
Dept: RADIOLOGY | Facility: HOSPITAL | Age: 52
Discharge: HOME | End: 2022-05-31
Attending: FAMILY MEDICINE
Payer: COMMERCIAL

## 2022-05-31 VITALS — WEIGHT: 130 LBS | HEIGHT: 64 IN | BODY MASS INDEX: 22.2 KG/M2

## 2022-05-31 DIAGNOSIS — Z12.31 ENCOUNTER FOR SCREENING MAMMOGRAM FOR MALIGNANT NEOPLASM OF BREAST: ICD-10-CM

## 2022-05-31 PROCEDURE — 77067 MAMMO DIGITAL SCREENING BILAT WITH TOMO: ICD-10-PCS | Mod: 26,,, | Performed by: RADIOLOGY

## 2022-05-31 PROCEDURE — 77067 SCR MAMMO BI INCL CAD: CPT | Mod: TC,PO

## 2022-05-31 PROCEDURE — 77067 SCR MAMMO BI INCL CAD: CPT | Mod: 26,,, | Performed by: RADIOLOGY

## 2022-05-31 PROCEDURE — 77063 MAMMO DIGITAL SCREENING BILAT WITH TOMO: ICD-10-PCS | Mod: 26,,, | Performed by: RADIOLOGY

## 2022-05-31 PROCEDURE — 77063 BREAST TOMOSYNTHESIS BI: CPT | Mod: 26,,, | Performed by: RADIOLOGY

## 2022-05-31 PROCEDURE — 77063 BREAST TOMOSYNTHESIS BI: CPT | Mod: TC,PO

## 2022-06-02 RX ORDER — ZOLPIDEM TARTRATE 5 MG/1
5 TABLET ORAL NIGHTLY PRN
Qty: 30 TABLET | Refills: 5 | Status: CANCELLED | OUTPATIENT
Start: 2022-06-03

## 2022-06-02 NOTE — TELEPHONE ENCOUNTER
----- Message from Janae Clement sent at 6/2/2022  1:31 PM CDT -----  Regarding: change pharamcy  Contact: pt  Pt states the [facility] does not have the Ambien in stock.  The prescription needs to be cancelled and sent to [facility] [address], [phone]    Pt can be reached at [phone]

## 2022-06-02 NOTE — TELEPHONE ENCOUNTER
No new care gaps identified.  [facility] Embedded Care Gaps. Reference number: [id number]. 6/02/2022   2:21:27 PM CDT

## 2022-09-08 ENCOUNTER — PATIENT MESSAGE (OUTPATIENT)
Dept: ADMINISTRATIVE | Facility: HOSPITAL | Age: 52
End: 2022-09-08
Payer: COMMERCIAL

## 2022-12-20 DIAGNOSIS — Z12.31 OTHER SCREENING MAMMOGRAM: ICD-10-CM

## 2023-04-19 ENCOUNTER — PATIENT MESSAGE (OUTPATIENT)
Dept: ADMINISTRATIVE | Facility: HOSPITAL | Age: 53
End: 2023-04-19
Payer: COMMERCIAL

## 2023-07-12 ENCOUNTER — HOSPITAL ENCOUNTER (OUTPATIENT)
Dept: RADIOLOGY | Facility: HOSPITAL | Age: 53
Discharge: HOME | End: 2023-07-12
Attending: FAMILY MEDICINE
Payer: COMMERCIAL

## 2023-07-12 DIAGNOSIS — Z12.31 OTHER SCREENING MAMMOGRAM: ICD-10-CM

## 2023-07-12 PROCEDURE — 77067 SCR MAMMO BI INCL CAD: CPT | Mod: 26,,, | Performed by: RADIOLOGY

## 2023-07-12 PROCEDURE — 77067 SCR MAMMO BI INCL CAD: CPT | Mod: TC,PO

## 2023-07-12 PROCEDURE — 77063 BREAST TOMOSYNTHESIS BI: CPT | Mod: 26,,, | Performed by: RADIOLOGY

## 2023-07-12 PROCEDURE — 77067 MAMMO DIGITAL SCREENING BILAT WITH TOMO: ICD-10-PCS | Mod: 26,,, | Performed by: RADIOLOGY

## 2023-07-12 PROCEDURE — 77063 MAMMO DIGITAL SCREENING BILAT WITH TOMO: ICD-10-PCS | Mod: 26,,, | Performed by: RADIOLOGY

## 2023-07-21 ENCOUNTER — PATIENT OUTREACH (OUTPATIENT)
Dept: ADMINISTRATIVE | Facility: HOSPITAL | Age: 53
End: 2023-07-21
Payer: COMMERCIAL

## 2023-10-25 ENCOUNTER — PATIENT OUTREACH (OUTPATIENT)
Dept: ADMINISTRATIVE | Facility: HOSPITAL | Age: 53
End: 2023-10-25
Payer: COMMERCIAL

## 2023-10-25 NOTE — PROGRESS NOTES
BR CONTINUITY/LAST MD VISIT >12MO: per chart review pt is overdue for annual visit, spoke to pt she stated she would call back for scheduling.

## 2023-11-20 RX ORDER — FLUOXETINE HYDROCHLORIDE 20 MG/1
60 CAPSULE ORAL
Qty: 270 CAPSULE | Refills: 0 | Status: SHIPPED | OUTPATIENT
Start: 2023-11-20 | End: 2024-01-09 | Stop reason: SDUPTHER

## 2023-11-20 NOTE — TELEPHONE ENCOUNTER
Care Due:                  Date            Visit Type   Department     Provider  --------------------------------------------------------------------------------                                EP -                              PRIMARY      [facility]  Last Visit: 05-      CARE ([facility])   MEDICINE       Kai Tobin  Next Visit: None Scheduled  None         None Found                                                            Last  Test          Frequency    Reason                     Performed    Due Date  --------------------------------------------------------------------------------    Office Visit  15 months..  FLUoxetine...............  05- 08-    [facility] Embedded Care Due Messages. Reference number: [id number].   11/20/2023 12:05:50 AM CST

## 2024-01-08 ENCOUNTER — TELEPHONE (OUTPATIENT)
Dept: FAMILY MEDICINE | Facility: CLINIC | Age: 54
End: 2024-01-08
Payer: COMMERCIAL

## 2024-01-08 NOTE — TELEPHONE ENCOUNTER
----- Message from Ting Orellana sent at 1/8/2024  9:31 AM CST -----  .Type:  Same Day Appointment Request    Caller is requesting a same day appointment.  Caller declined first available appointment listed below.    Name of Caller:.Kya Avitia   When is the first available appointment?01/09/2023  Symptoms:flu last week and her chest congestion is concerning  Best Call Back Number:.[phone]   Additional Information:

## 2024-01-09 ENCOUNTER — OFFICE VISIT (OUTPATIENT)
Dept: FAMILY MEDICINE | Facility: CLINIC | Age: 54
End: 2024-01-09
Payer: COMMERCIAL

## 2024-01-09 VITALS
BODY MASS INDEX: 22.45 KG/M2 | HEART RATE: 73 BPM | SYSTOLIC BLOOD PRESSURE: 118 MMHG | WEIGHT: 131.5 LBS | TEMPERATURE: 98 F | DIASTOLIC BLOOD PRESSURE: 79 MMHG | HEIGHT: 64 IN

## 2024-01-09 DIAGNOSIS — Z12.11 SCREENING FOR COLON CANCER: ICD-10-CM

## 2024-01-09 DIAGNOSIS — Z00.00 ROUTINE HISTORY AND PHYSICAL EXAMINATION OF ADULT: Primary | ICD-10-CM

## 2024-01-09 DIAGNOSIS — F33.42 RECURRENT MAJOR DEPRESSIVE DISORDER, IN FULL REMISSION: ICD-10-CM

## 2024-01-09 DIAGNOSIS — J06.9 UPPER RESPIRATORY TRACT INFECTION, UNSPECIFIED TYPE: ICD-10-CM

## 2024-01-09 PROCEDURE — 3074F SYST BP LT 130 MM HG: CPT | Mod: CPTII,S$GLB,, | Performed by: FAMILY MEDICINE

## 2024-01-09 PROCEDURE — 99396 PREV VISIT EST AGE 40-64: CPT | Mod: S$GLB,,, | Performed by: FAMILY MEDICINE

## 2024-01-09 PROCEDURE — 3008F BODY MASS INDEX DOCD: CPT | Mod: CPTII,S$GLB,, | Performed by: FAMILY MEDICINE

## 2024-01-09 PROCEDURE — 3078F DIAST BP <80 MM HG: CPT | Mod: CPTII,S$GLB,, | Performed by: FAMILY MEDICINE

## 2024-01-09 PROCEDURE — 99999 PR PBB SHADOW E&M-EST. PATIENT-LVL IV: CPT | Mod: PBBFAC,,, | Performed by: FAMILY MEDICINE

## 2024-01-09 PROCEDURE — 1159F MED LIST DOCD IN RCRD: CPT | Mod: CPTII,S$GLB,, | Performed by: FAMILY MEDICINE

## 2024-01-09 RX ORDER — FLUOXETINE HYDROCHLORIDE 20 MG/1
60 CAPSULE ORAL DAILY
Qty: 270 CAPSULE | Refills: 3 | Status: SHIPPED | OUTPATIENT
Start: 2024-01-09

## 2024-01-09 RX ORDER — BENZONATATE 200 MG/1
200 CAPSULE ORAL 3 TIMES DAILY PRN
Qty: 30 CAPSULE | Refills: 0 | Status: SHIPPED | OUTPATIENT
Start: 2024-01-09 | End: 2024-01-19

## 2024-01-10 NOTE — PROGRESS NOTES
Presents physical exam.  Depression stable current medication needs refill.  She would like to continue.  No SI/HI.  She does complain of some upper respiratory symptoms over the past week.  She is feeling better.  She thought she might have had the flu.  Did not do any testing.    Kya was seen today for chest congestion and cough.    Diagnoses and all orders for this visit:    Routine history and physical examination of adult  -     Comprehensive Metabolic Panel; Future  -     Lipid Panel; Future  -     Cologuard Screening (Multitarget Stool DNA); Future  -     Cologuard Screening (Multitarget Stool DNA)    Recurrent major depressive disorder, in full remission    Upper respiratory tract infection, unspecified type    Screening for colon cancer  -     Cologuard Screening (Multitarget Stool DNA); Future  -     Cologuard Screening (Multitarget Stool DNA)    Other orders  -     FLUoxetine 20 MG capsule; Take 3 capsules (60 mg total) by mouth once daily.  -     benzonatate (TESSALON) 200 MG capsule; Take 1 capsule (200 mg total) by mouth 3 (three) times daily as needed for Cough.    Follow-up symptoms not continuing to improve.              Past Medical History:  Past Medical History:   Diagnosis Date    Allergy     Chronic back pain 11/15/2018    Chronic neck pain     Chronically on opiate therapy     Depression     Kidney stone      Past Surgical History:   Procedure Laterality Date    LEEP       Review of patient's allergies indicates:   Allergen Reactions    No known drug allergies      Current Outpatient Medications on File Prior to Visit   Medication Sig Dispense Refill    cyclobenzaprine (FLEXERIL) 10 MG tablet Uses prn      estradiol (ESTRACE) 1 MG tablet Take 1 mg by mouth once daily.  6    medroxyPROGESTERone (PROVERA) 2.5 MG tablet Take 2.5 mg by mouth once daily.      zolpidem (AMBIEN) 5 MG Tab Take 1 tablet (5 mg total) by mouth nightly as needed (insomnia). 30 tablet 5    [DISCONTINUED] FLUoxetine 20  "MG capsule TAKE 3 CAPSULES BY MOUTH ONCE DAILY. 270 capsule 0     No current facility-administered medications on file prior to visit.     Social History     Socioeconomic History    Marital status:    Tobacco Use    Smoking status: Never    Smokeless tobacco: Never   Substance and Sexual Activity    Alcohol use: Yes     Alcohol/week: 5.8 standard drinks of alcohol     Types: 7 Standard drinks or equivalent per week    Drug use: No     Family History   Problem Relation Age of Onset    Breast cancer Maternal Grandmother            ROS:  GENERAL: No fever, chills,  or significant weight changes.   CARDIOVASCULAR: Denies chest pain, PND, orthopnea or reduced exercise tolerance.  ABDOMEN: Appetite fine. Denies diarrhea, abdominal pain, hematemesis or blood in stool.  URINARY: No flank pain, dysuria or hematuria.    Vitals:    01/09/24 1335   BP: 118/79   Pulse: 73   Temp: 97.7 °F (36.5 °C)   TempSrc: Temporal   Weight: 59.6 kg (131 lb 8 oz)   Height: 5' 4" (1.626 m)     Wt Readings from Last 3 Encounters:   01/09/24 59.6 kg (131 lb 8 oz)   05/31/22 59 kg (130 lb)   05/23/22 59 kg (130 lb 1.6 oz)       OBJECTIVE:   APPEARANCE: Well nourished, well developed, in no acute distress.    HEAD: Normocephalic.  Atraumatic.  No sinus tenderness.  EYES:   Right eye: Pupil reactive.  Conjunctiva clear.    Left eye: Pupil reactive.  Conjunctiva clear.  EOMI.    EARS: TM's intact. Light reflex normal. No retraction or perforation.    NOSE:  clear.  MOUTH & THROAT:  No pharyngeal erythema or exudate. No lesions.  NECK: Supple. No bruits.  No JVD.  No cervical lymphadenopathy.  No thyromegaly.    CHEST: Breath sounds clear bilaterally.  Normal respiratory effort  CARDIOVASCULAR: Normal rate.  Regular rhythm.  No murmurs.  No rub.  No gallops.  ABDOMEN: Bowel sounds normal.  Soft.  No tenderness.  No organomegaly.  PERIPHERAL VASCULAR: No cyanosis.  No clubbing.  No edema.  NEUROLOGIC: No focal findings.  MENTAL STATUS: Alert.  " Oriented x 3.

## 2024-01-17 ENCOUNTER — PATIENT OUTREACH (OUTPATIENT)
Dept: ADMINISTRATIVE | Facility: HOSPITAL | Age: 54
End: 2024-01-17
Payer: COMMERCIAL

## 2024-02-12 ENCOUNTER — OFFICE VISIT (OUTPATIENT)
Dept: URGENT CARE | Facility: CLINIC | Age: 54
End: 2024-02-12
Payer: COMMERCIAL

## 2024-02-12 VITALS
DIASTOLIC BLOOD PRESSURE: 70 MMHG | HEART RATE: 82 BPM | WEIGHT: 131 LBS | TEMPERATURE: 98 F | BODY MASS INDEX: 22.36 KG/M2 | OXYGEN SATURATION: 96 % | HEIGHT: 64 IN | RESPIRATION RATE: 16 BRPM | SYSTOLIC BLOOD PRESSURE: 114 MMHG

## 2024-02-12 DIAGNOSIS — R10.31 RIGHT LOWER QUADRANT ABDOMINAL PAIN: Primary | ICD-10-CM

## 2024-02-12 DIAGNOSIS — R10.9 FLANK PAIN: ICD-10-CM

## 2024-02-12 LAB
BILIRUB UR QL STRIP: NEGATIVE
GLUCOSE UR QL STRIP: NEGATIVE
KETONES UR QL STRIP: NEGATIVE
LEUKOCYTE ESTERASE UR QL STRIP: NEGATIVE
PH, POC UA: 5 (ref 5–8)
POC BLOOD, URINE: NEGATIVE
POC NITRATES, URINE: NEGATIVE
PROT UR QL STRIP: NEGATIVE
SP GR UR STRIP: <1.005 (ref 1–1.03)
UROBILINOGEN UR STRIP-ACNC: NORMAL (ref 0.1–1.1)

## 2024-02-12 PROCEDURE — 99213 OFFICE O/P EST LOW 20 MIN: CPT | Mod: S$GLB,,, | Performed by: EMERGENCY MEDICINE

## 2024-02-12 PROCEDURE — 81003 URINALYSIS AUTO W/O SCOPE: CPT | Mod: QW,S$GLB,, | Performed by: EMERGENCY MEDICINE

## 2024-02-12 PROCEDURE — 74019 RADEX ABDOMEN 2 VIEWS: CPT | Mod: S$GLB,,, | Performed by: RADIOLOGY

## 2024-02-12 RX ORDER — DICYCLOMINE HYDROCHLORIDE 20 MG/1
20 TABLET ORAL EVERY 6 HOURS PRN
Qty: 30 TABLET | Refills: 0 | Status: SHIPPED | OUTPATIENT
Start: 2024-02-12 | End: 2024-02-22

## 2024-02-12 RX ORDER — POLYETHYLENE GLYCOL 3350 17 G/17G
17 POWDER, FOR SOLUTION ORAL DAILY
Qty: 10 EACH | Refills: 0 | Status: SHIPPED | OUTPATIENT
Start: 2024-02-12 | End: 2024-02-22

## 2024-02-12 NOTE — PATIENT INSTRUCTIONS
You are scheduled for a pelvic ultrasound on Wednesday 2/14/14 at 3:45pm at [place] location. Call [phone] if you need to change this or reschedule.    We will contact you with Xray results.     I am going to treat you with a laxative and medication for cramping.     If you are worsening or have uncontrolled pain, go to hospital ER.

## 2024-02-12 NOTE — LETTER
February 12, 2024      [facility] - [place] [address], [address]  [facility] [address]  Phone: [phone]  Fax: [phone]       Patient: Kya Avitia   YOB: 1970  Date of Visit: 02/12/2024    To Whom It May Concern:    Slime Avitia  was at [facility] on 02/12/2024. The patient may return to work on 2/13/2024. Please excuse for time missed today. If you have any questions or concerns, or if I can be of further assistance, please do not hesitate to contact me.    Sincerely,    Consuelo Coombs MD

## 2024-02-12 NOTE — PROGRESS NOTES
"Subjective:      Patient ID: Kya Avitia is a 53 y.o. female.    Vitals:  height is 5' 4" (1.626 m) and weight is 59.4 kg (131 lb). Her temporal temperature is 97.6 °F (36.4 °C). Her blood pressure is 114/70 and her pulse is 82. Her respiration is 16 and oxygen saturation is 96%.     Chief Complaint: Flank Pain    53 yr old female patient presents today with complaints of stabbing right lower abdominal pain since yesterday, patient took aleve OTC with minor relief. Patient does not report any changes in urinary frequency. No hematuria. No N/V. No rash. Had similar episode 1/27/24 - resolved with laxatives. Does struggle with constipation - last BM yesterday but have been thinner stools. Has never had colonoscopy. Mailed in Cologuard screening 2 weeks ago and awaiting results.         Constitution: Negative for chills, sweating, fatigue, fever and unexpected weight change.   HENT:  Negative for ear pain, drooling, congestion, sore throat, trouble swallowing and voice change.    Neck: Negative for neck pain, neck stiffness, painful lymph nodes and neck swelling.   Cardiovascular:  Negative for chest pain, leg swelling, palpitations, sob on exertion and passing out.   Eyes:  Negative for eye pain, eye redness, photophobia, double vision and blurred vision.   Respiratory:  Negative for chest tightness, cough, sputum production, bloody sputum, stridor and wheezing.    Gastrointestinal:  Positive for abdominal pain. Negative for abdominal bloating, nausea, vomiting, constipation, diarrhea and heartburn.   Musculoskeletal:  Negative for joint pain, joint swelling, back pain, muscle cramps and muscle ache.   Skin:  Negative for rash and hives.   Allergic/Immunologic: Negative for hives and itching.   Neurological:  Negative for dizziness, light-headedness, passing out, loss of balance, headaches, altered mental status, loss of consciousness and seizures.   Hematologic/Lymphatic: Negative for swollen lymph nodes. "   Psychiatric/Behavioral:  Negative for altered mental status and nervous/anxious. The patient is not nervous/anxious.       Objective:     Physical Exam   Constitutional: She is oriented to person, place, and time. She appears well-developed. She does not appear ill.   HENT:   Head: Normocephalic and atraumatic.   Ears:   Right Ear: External ear normal.   Left Ear: External ear normal.   Nose: Nose normal.   Mouth/Throat: Mucous membranes are normal.   Eyes: Conjunctivae and lids are normal.   Neck: Trachea normal. Neck supple.   Cardiovascular: Normal rate, regular rhythm and normal heart sounds.   Pulmonary/Chest: Effort normal and breath sounds normal. No respiratory distress.   Abdominal: Normal appearance and bowel sounds are normal. She exhibits no distension and no mass. Soft. There is no abdominal tenderness.   Musculoskeletal: Normal range of motion.         General: Normal range of motion.   Neurological: She is alert and oriented to person, place, and time. She has normal strength.   Skin: Skin is warm, dry, intact, not diaphoretic and not pale.   Psychiatric: Her speech is normal and behavior is normal. Judgment and thought content normal.   Nursing note and vitals reviewed.    Results for orders placed or performed in visit on 02/12/24   POCT Urinalysis, Dipstick, Automated, W/O Scope   Result Value Ref Range    POC Blood, Urine Negative Negative    POC Bilirubin, Urine Negative Negative    POC Urobilinogen, Urine normal 0.1 - 1.1    POC Ketones, Urine Negative Negative    POC Protein, Urine Negative Negative    POC Nitrates, Urine Negative Negative    POC Glucose, Urine Negative Negative    pH, UA 5.0 5 - 8    POC Specific Gravity, Urine <1.005 1.003 - 1.029    POC Leukocytes, Urine Negative Negative     Assessment:     1. Right lower quadrant abdominal pain    2. Flank pain        Plan:     UA WNL.     Xray with some stool burden but no overt obstruction.     Will arrange pelvic US to eval right  ovary to r/o intermittent torsion although low on differential dx.    Will treat with Miralax and Bentyl to manage symptoms. Strict instructions to go to ER if not improving or for severe uncontrolled pain.     Right lower quadrant abdominal pain  -     X-Ray Abdomen Flat And Erect; Future; Expected date: 02/12/2024  -     US Pelvis Complete Non OB; Future; Expected date: 02/12/2024  -     polyethylene glycol (GLYCOLAX) 17 gram PwPk; Take 17 g by mouth once daily. Take for constipation for 10 days  Dispense: 10 each; Refill: 0  -     dicyclomine (BENTYL) 20 mg tablet; Take 1 tablet (20 mg total) by mouth every 6 (six) hours as needed (abdominal discomfort).  Dispense: 30 tablet; Refill: 0    Flank pain  -     POCT Urinalysis, Dipstick, Automated, W/O Scope

## 2024-02-14 ENCOUNTER — HOSPITAL ENCOUNTER (OUTPATIENT)
Dept: RADIOLOGY | Facility: HOSPITAL | Age: 54
Discharge: HOME | End: 2024-02-14
Attending: EMERGENCY MEDICINE
Payer: COMMERCIAL

## 2024-02-14 ENCOUNTER — TELEPHONE (OUTPATIENT)
Dept: URGENT CARE | Facility: CLINIC | Age: 54
End: 2024-02-14
Payer: COMMERCIAL

## 2024-02-14 DIAGNOSIS — R10.31 RIGHT LOWER QUADRANT ABDOMINAL PAIN: ICD-10-CM

## 2024-02-14 PROCEDURE — 76830 TRANSVAGINAL US NON-OB: CPT | Mod: 26,,, | Performed by: RADIOLOGY

## 2024-02-14 PROCEDURE — 76856 US EXAM PELVIC COMPLETE: CPT | Mod: 26,,, | Performed by: RADIOLOGY

## 2024-02-14 PROCEDURE — 76830 TRANSVAGINAL US NON-OB: CPT | Mod: TC,PO

## 2024-02-14 NOTE — TELEPHONE ENCOUNTER
Placed call to patient. No answer. Left VM to return call.     Completed Pelvic US today - normal ovaries, small uterine fibroid.     No explanation for her pain from this test. Will see if her abdominal pain is any better after Miralax, Bentyl. If no, needs to schedule with PCP or if severe, go to ER.

## 2024-02-15 ENCOUNTER — TELEPHONE (OUTPATIENT)
Dept: URGENT CARE | Facility: CLINIC | Age: 54
End: 2024-02-15
Payer: COMMERCIAL

## 2024-02-15 DIAGNOSIS — D25.9 UTERINE LEIOMYOMA, UNSPECIFIED LOCATION: Primary | ICD-10-CM

## 2024-02-18 LAB — NONINV COLON CA DNA+OCC BLD SCRN STL QL: NEGATIVE

## 2024-03-05 ENCOUNTER — OFFICE VISIT (OUTPATIENT)
Dept: FAMILY MEDICINE | Facility: CLINIC | Age: 54
End: 2024-03-05
Payer: COMMERCIAL

## 2024-03-05 VITALS
OXYGEN SATURATION: 99 % | SYSTOLIC BLOOD PRESSURE: 110 MMHG | DIASTOLIC BLOOD PRESSURE: 75 MMHG | BODY MASS INDEX: 21.39 KG/M2 | HEART RATE: 75 BPM | WEIGHT: 128.38 LBS | HEIGHT: 65 IN | TEMPERATURE: 97 F

## 2024-03-05 DIAGNOSIS — R10.31 RLQ ABDOMINAL PAIN: Primary | ICD-10-CM

## 2024-03-05 DIAGNOSIS — R10.31 RIGHT LOWER QUADRANT PAIN: ICD-10-CM

## 2024-03-05 DIAGNOSIS — K59.00 CONSTIPATION, UNSPECIFIED CONSTIPATION TYPE: ICD-10-CM

## 2024-03-05 PROCEDURE — 99214 OFFICE O/P EST MOD 30 MIN: CPT | Mod: S$GLB,,, | Performed by: FAMILY MEDICINE

## 2024-03-05 PROCEDURE — 3078F DIAST BP <80 MM HG: CPT | Mod: CPTII,S$GLB,, | Performed by: FAMILY MEDICINE

## 2024-03-05 PROCEDURE — 3008F BODY MASS INDEX DOCD: CPT | Mod: CPTII,S$GLB,, | Performed by: FAMILY MEDICINE

## 2024-03-05 PROCEDURE — 1159F MED LIST DOCD IN RCRD: CPT | Mod: CPTII,S$GLB,, | Performed by: FAMILY MEDICINE

## 2024-03-05 PROCEDURE — 99999 PR PBB SHADOW E&M-EST. PATIENT-LVL IV: CPT | Mod: PBBFAC,,, | Performed by: FAMILY MEDICINE

## 2024-03-05 PROCEDURE — 3074F SYST BP LT 130 MM HG: CPT | Mod: CPTII,S$GLB,, | Performed by: FAMILY MEDICINE

## 2024-03-05 RX ORDER — POLYETHYLENE GLYCOL 3350 17 G/17G
17 POWDER, FOR SOLUTION ORAL DAILY
Qty: 510 G | COMMUNITY
Start: 2024-03-05

## 2024-03-05 RX ORDER — DICYCLOMINE HYDROCHLORIDE 20 MG/1
20 TABLET ORAL EVERY 6 HOURS
COMMUNITY

## 2024-03-05 NOTE — PATIENT INSTRUCTIONS
Emanuel Boland,     If you are due for any health screening(s) below please notify me so we can arrange them to be ordered and scheduled. Most healthy patients at your age complete them, but you are free to accept or refuse.     If you can't do it, I'll definitely understand. If you can, I'd certainly appreciate it!    All of your core healthy metrics are met.

## 2024-03-05 NOTE — PROGRESS NOTES
Patient presents with a complaint of some recurring right lower abdomen pain.  Three episodes in the past month, last episode this morning but better now..  She had some constipation.  She tried MiraLax and symptoms seem to improve each time.  Has not been using MiraLax regularly.  She did have visit with urgent care and had pelvic ultrasound with small fibroid noted.  She had an x-ray which showed possible renal calculus.  Dipstick urine was negative.  They gave her dicyclomine as well.  Recently had normal Cologuard test..  Postmenopausal last cycle 2 years ago.    Kya was seen today for abdominal pain.    Diagnoses and all orders for this visit:    RLQ abdominal pain    Constipation, unspecified constipation type    Right lower quadrant pain  -     CT Abdomen Pelvis  Without Contrast; Future  -     CBC Auto Differential; Future    Other orders  -     polyethylene glycol (GLYCOLAX) 17 gram/dose powder; Take 17 g by mouth once daily. Mix in water or juice      Continue MiraLax daily.  Check CT and laboratory.  Has CMP lipid pending as well.  Follow-up symptoms persist with GI if CT unrevealing.            Past Medical History:  Past Medical History:   Diagnosis Date    Allergy     Chronic back pain 11/15/2018    Chronic neck pain     Chronically on opiate therapy     Depression     Kidney stone      Past Surgical History:   Procedure Laterality Date    LEEP       Review of patient's allergies indicates:   Allergen Reactions    No known drug allergies      Current Outpatient Medications on File Prior to Visit   Medication Sig Dispense Refill    dicyclomine (BENTYL) 20 mg tablet Take 20 mg by mouth every 6 (six) hours. As needed.      estradiol (ESTRACE) 1 MG tablet Take 1 mg by mouth once daily.  6    FLUoxetine 20 MG capsule Take 3 capsules (60 mg total) by mouth once daily. 270 capsule 3    medroxyPROGESTERone (PROVERA) 2.5 MG tablet Take 2.5 mg by mouth once daily.      zolpidem (AMBIEN) 5 MG Tab Take 1  "tablet (5 mg total) by mouth nightly as needed (insomnia). 30 tablet 5    cyclobenzaprine (FLEXERIL) 10 MG tablet Uses prn       No current facility-administered medications on file prior to visit.     Social History     Socioeconomic History    Marital status:    Tobacco Use    Smoking status: Never     Passive exposure: Never    Smokeless tobacco: Never   Substance and Sexual Activity    Alcohol use: Yes     Alcohol/week: 5.8 standard drinks of alcohol     Types: 7 Standard drinks or equivalent per week    Drug use: No     Family History   Problem Relation Age of Onset    Breast cancer Maternal Grandmother            ROS:  GENERAL: No fever, chills,  or significant weight changes.   CARDIOVASCULAR: Denies chest pain, PND, orthopnea or reduced exercise tolerance.  ABDOMEN: Appetite fine. Denies diarrhea,  hematemesis or blood in stool.  URINARY: No flank pain, dysuria or hematuria.    Vitals:    03/05/24 1433   BP: 110/75   Pulse: 75   Temp: 97.3 °F (36.3 °C)   SpO2: 99%   Weight: 58.2 kg (128 lb 6.4 oz)   Height: 5' 4.5" (1.638 m)     Wt Readings from Last 3 Encounters:   03/05/24 58.2 kg (128 lb 6.4 oz)   02/12/24 59.4 kg (131 lb)   01/09/24 59.6 kg (131 lb 8 oz)       OBJECTIVE:   APPEARANCE: Well nourished, well developed, in no acute distress.    HEAD: Normocephalic.  Atraumatic.  No sinus tenderness.  EYES:   Right eye: Pupil reactive.  Conjunctiva clear.    Left eye: Pupil reactive.  Conjunctiva clear.  EOMI.    EARS: TM's intact. Light reflex normal. No retraction or perforation.    NOSE:  clear.  MOUTH & THROAT:  No pharyngeal erythema or exudate. No lesions.  NECK: Supple. No bruits.  No JVD.  No cervical lymphadenopathy.  No thyromegaly.    CHEST: Breath sounds clear bilaterally.  Normal respiratory effort  CARDIOVASCULAR: Normal rate.  Regular rhythm.  No murmurs.  No rub.  No gallops.  ABDOMEN: Bowel sounds normal.  Soft.  No tenderness.  No organomegaly.  PERIPHERAL VASCULAR: No cyanosis.  No " clubbing.  No edema.  NEUROLOGIC: No focal findings.  MENTAL STATUS: Alert.  Oriented x 3.

## 2024-03-16 ENCOUNTER — LAB VISIT (OUTPATIENT)
Dept: LAB | Facility: HOSPITAL | Age: 54
End: 2024-03-16
Attending: FAMILY MEDICINE
Payer: COMMERCIAL

## 2024-03-16 DIAGNOSIS — Z00.00 ROUTINE HISTORY AND PHYSICAL EXAMINATION OF ADULT: ICD-10-CM

## 2024-03-16 DIAGNOSIS — R10.31 RIGHT LOWER QUADRANT PAIN: ICD-10-CM

## 2024-03-16 LAB
ALBUMIN SERPL BCP-MCNC: 3.2 G/DL (ref 3.5–5.2)
ALP SERPL-CCNC: 126 U/L (ref 55–135)
ALT SERPL W/O P-5'-P-CCNC: 12 U/L (ref 10–44)
ANION GAP SERPL CALC-SCNC: 8 MMOL/L (ref 8–16)
AST SERPL-CCNC: 16 U/L (ref 10–40)
BASOPHILS # BLD AUTO: 0.05 K/UL (ref 0–0.2)
BASOPHILS NFR BLD: 0.4 % (ref 0–1.9)
BILIRUB SERPL-MCNC: 0.3 MG/DL (ref 0.1–1)
BUN SERPL-MCNC: 11 MG/DL (ref 6–20)
CALCIUM SERPL-MCNC: 9.2 MG/DL (ref 8.7–10.5)
CHLORIDE SERPL-SCNC: 100 MMOL/L (ref 95–110)
CHOLEST SERPL-MCNC: 140 MG/DL (ref 120–199)
CHOLEST/HDLC SERPL: 3 {RATIO} (ref 2–5)
CO2 SERPL-SCNC: 29 MMOL/L (ref 23–29)
CREAT SERPL-MCNC: 0.8 MG/DL (ref 0.5–1.4)
DIFFERENTIAL METHOD BLD: ABNORMAL
EOSINOPHIL # BLD AUTO: 0.5 K/UL (ref 0–0.5)
EOSINOPHIL NFR BLD: 4.2 % (ref 0–8)
ERYTHROCYTE [DISTWIDTH] IN BLOOD BY AUTOMATED COUNT: 13.3 % (ref 11.5–14.5)
EST. GFR  (NO RACE VARIABLE): >60 ML/MIN/1.73 M^2
GLUCOSE SERPL-MCNC: 84 MG/DL (ref 70–110)
HCT VFR BLD AUTO: 35.3 % (ref 37–48.5)
HDLC SERPL-MCNC: 46 MG/DL (ref 40–75)
HDLC SERPL: 32.9 % (ref 20–50)
HGB BLD-MCNC: 11.1 G/DL (ref 12–16)
IMM GRANULOCYTES # BLD AUTO: 0.17 K/UL (ref 0–0.04)
IMM GRANULOCYTES NFR BLD AUTO: 1.5 % (ref 0–0.5)
LDLC SERPL CALC-MCNC: 84.4 MG/DL (ref 63–159)
LYMPHOCYTES # BLD AUTO: 1.6 K/UL (ref 1–4.8)
LYMPHOCYTES NFR BLD: 14.3 % (ref 18–48)
MCH RBC QN AUTO: 28.2 PG (ref 27–31)
MCHC RBC AUTO-ENTMCNC: 31.4 G/DL (ref 32–36)
MCV RBC AUTO: 90 FL (ref 82–98)
MONOCYTES # BLD AUTO: 0.9 K/UL (ref 0.3–1)
MONOCYTES NFR BLD: 7.7 % (ref 4–15)
NEUTROPHILS # BLD AUTO: 8.2 K/UL (ref 1.8–7.7)
NEUTROPHILS NFR BLD: 71.9 % (ref 38–73)
NONHDLC SERPL-MCNC: 94 MG/DL
NRBC BLD-RTO: 0 /100 WBC
PLATELET # BLD AUTO: 679 K/UL (ref 150–450)
PMV BLD AUTO: 8.5 FL (ref 9.2–12.9)
POTASSIUM SERPL-SCNC: 5.2 MMOL/L (ref 3.5–5.1)
PROT SERPL-MCNC: 7 G/DL (ref 6–8.4)
RBC # BLD AUTO: 3.93 M/UL (ref 4–5.4)
SODIUM SERPL-SCNC: 137 MMOL/L (ref 136–145)
TRIGL SERPL-MCNC: 48 MG/DL (ref 30–150)
WBC # BLD AUTO: 11.37 K/UL (ref 3.9–12.7)

## 2024-03-16 PROCEDURE — 36415 COLL VENOUS BLD VENIPUNCTURE: CPT | Mod: PO | Performed by: FAMILY MEDICINE

## 2024-03-16 PROCEDURE — 80053 COMPREHEN METABOLIC PANEL: CPT | Performed by: FAMILY MEDICINE

## 2024-03-16 PROCEDURE — 80061 LIPID PANEL: CPT | Performed by: FAMILY MEDICINE

## 2024-03-16 PROCEDURE — 85025 COMPLETE CBC W/AUTO DIFF WBC: CPT | Performed by: FAMILY MEDICINE

## 2024-03-18 ENCOUNTER — HOSPITAL ENCOUNTER (OUTPATIENT)
Dept: RADIOLOGY | Facility: HOSPITAL | Age: 54
Discharge: HOME | End: 2024-03-18
Attending: FAMILY MEDICINE
Payer: COMMERCIAL

## 2024-03-18 DIAGNOSIS — R10.31 RIGHT LOWER QUADRANT PAIN: ICD-10-CM

## 2024-03-18 DIAGNOSIS — R93.2 ABNORMAL CT SCAN, GALLBLADDER: Primary | ICD-10-CM

## 2024-03-18 PROCEDURE — 74176 CT ABD & PELVIS W/O CONTRAST: CPT | Mod: 26,,, | Performed by: RADIOLOGY

## 2024-03-18 PROCEDURE — 25500020 PHARM REV CODE 255: Mod: PO | Performed by: FAMILY MEDICINE

## 2024-03-18 PROCEDURE — 74176 CT ABD & PELVIS W/O CONTRAST: CPT | Mod: TC,PO

## 2024-03-18 PROCEDURE — A9698 NON-RAD CONTRAST MATERIALNOC: HCPCS | Mod: PO | Performed by: FAMILY MEDICINE

## 2024-03-18 RX ADMIN — IOHEXOL 1000 ML: 9 SOLUTION ORAL at 04:03

## 2024-03-19 ENCOUNTER — TELEPHONE (OUTPATIENT)
Dept: SURGERY | Facility: CLINIC | Age: 54
End: 2024-03-19
Payer: COMMERCIAL

## 2024-03-19 NOTE — TELEPHONE ENCOUNTER
I called patient and scheduled her to see Dr. Tee on Thursday, 3/21/24 @ 2:30pm in [place] for her gallbladder.  [facility]

## 2024-03-19 NOTE — TELEPHONE ENCOUNTER
----- Message from Kina Taylor sent at 3/19/2024 12:04 PM CDT -----  Name of Caller pt   Reason for Visit/Symptoms pt requesting to be seen. Has referral in [facility] for gall gladder. Nothing available . Call pt   Best Contact Number or Confirm if Mychart Preferred [phone] (home)     Preferred Date/Time of Appointment asap   Interested in Virtual Visit (yes/no)  Additional Information

## 2024-03-21 ENCOUNTER — OFFICE VISIT (OUTPATIENT)
Dept: SURGERY | Facility: CLINIC | Age: 54
End: 2024-03-21
Payer: COMMERCIAL

## 2024-03-21 VITALS
BODY MASS INDEX: 20.83 KG/M2 | TEMPERATURE: 97 F | HEART RATE: 77 BPM | DIASTOLIC BLOOD PRESSURE: 71 MMHG | SYSTOLIC BLOOD PRESSURE: 115 MMHG | WEIGHT: 125 LBS | HEIGHT: 65 IN

## 2024-03-21 DIAGNOSIS — K80.20 CALCULUS OF GALLBLADDER WITHOUT CHOLECYSTITIS WITHOUT OBSTRUCTION: ICD-10-CM

## 2024-03-21 DIAGNOSIS — R45.89 ANXIETY ABOUT HEALTH: Primary | ICD-10-CM

## 2024-03-21 PROCEDURE — 3078F DIAST BP <80 MM HG: CPT | Mod: CPTII,S$GLB,, | Performed by: SURGERY

## 2024-03-21 PROCEDURE — 3008F BODY MASS INDEX DOCD: CPT | Mod: CPTII,S$GLB,, | Performed by: SURGERY

## 2024-03-21 PROCEDURE — 1159F MED LIST DOCD IN RCRD: CPT | Mod: CPTII,S$GLB,, | Performed by: SURGERY

## 2024-03-21 PROCEDURE — 99999 PR PBB SHADOW E&M-EST. PATIENT-LVL III: CPT | Mod: PBBFAC,,, | Performed by: SURGERY

## 2024-03-21 PROCEDURE — 3074F SYST BP LT 130 MM HG: CPT | Mod: CPTII,S$GLB,, | Performed by: SURGERY

## 2024-03-21 PROCEDURE — 99204 OFFICE O/P NEW MOD 45 MIN: CPT | Mod: S$GLB,,, | Performed by: SURGERY

## 2024-03-21 RX ORDER — ALPRAZOLAM 0.25 MG/1
0.5 TABLET ORAL 3 TIMES DAILY
Qty: 10 TABLET | Refills: 0 | Status: SHIPPED | OUTPATIENT
Start: 2024-03-21 | End: 2024-05-01

## 2024-03-21 NOTE — PROGRESS NOTES
Subjective     Patient ID: Kya Avitia is a 53 y.o. female.    Chief Complaint: Consult (GB)    HPI  this is a pleasant 53 year female who was referred to me for evaluation of abnormal CT scan.  Patient notes that she was having some discomfort and pain in the right lower abdomen.  She notes that when it was present was relatively sharp and uncomfortable.  It lasted for several minutes and ultimately subsided.  She reports this happened twice a proximally a month ago.  She has not had any pains or discomfort recently.  Given the presence of the pain in the lower abdomen she had an ultrasound of the pelvis with a small probable fibroid noted.  No other significant abnormalities were present.  She then had a CT scan performed last week which demonstrated significant gallbladder wall thickening and edema measuring up to 23 mm.  There was mass effect noted along the duodenal.  There was no ductal dilatation and no obvious gallstones present.  She was referred to me for evaluation.  Patient has no other significant medical history denies any significant abdominal surgical history.  Review of Systems   Constitutional:  Negative for activity change and appetite change.   Respiratory:  Negative for apnea and wheezing.    Cardiovascular:  Negative for chest pain.   Gastrointestinal:  Negative for abdominal distention, nausea and vomiting.   Musculoskeletal:  Negative for arthralgias.          Objective     Physical Exam  Vitals reviewed.   Cardiovascular:      Rate and Rhythm: Normal rate.      Pulses: Normal pulses.   Pulmonary:      Effort: Pulmonary effort is normal.   Abdominal:      General: There is no distension.      Tenderness: There is no abdominal tenderness.   Skin:     General: Skin is warm.   Neurological:      Mental Status: She is alert.   Psychiatric:         Mood and Affect: Mood normal.     CT scan:    Irregular gallbladder wall thickening and edema measuring up to 23 mm.  Mass effect on the adjacent  duodenum.  No intra or extrahepatic biliary ductal dilatation.  Pericholecystic edema.  No radiopaque gallstones.     Oral contrast within the distal esophagus.        Lab Results   Component Value Date    WBC 11.37 03/16/2024    HGB 11.1 (L) 03/16/2024    HCT 35.3 (L) 03/16/2024    MCV 90 03/16/2024     (H) 03/16/2024       CMP  Sodium   Date Value Ref Range Status   03/16/2024 137 136 - 145 mmol/L Final     Potassium   Date Value Ref Range Status   03/16/2024 5.2 (H) 3.5 - 5.1 mmol/L Final     Chloride   Date Value Ref Range Status   03/16/2024 100 95 - 110 mmol/L Final     CO2   Date Value Ref Range Status   03/16/2024 29 23 - 29 mmol/L Final     Glucose   Date Value Ref Range Status   03/16/2024 84 70 - 110 mg/dL Final     BUN   Date Value Ref Range Status   03/16/2024 11 6 - 20 mg/dL Final     Creatinine   Date Value Ref Range Status   03/16/2024 0.8 0.5 - 1.4 mg/dL Final     Calcium   Date Value Ref Range Status   03/16/2024 9.2 8.7 - 10.5 mg/dL Final     Total Protein   Date Value Ref Range Status   03/16/2024 7.0 6.0 - 8.4 g/dL Final     Albumin   Date Value Ref Range Status   03/16/2024 3.2 (L) 3.5 - 5.2 g/dL Final     Total Bilirubin   Date Value Ref Range Status   03/16/2024 0.3 0.1 - 1.0 mg/dL Final     Comment:     For infants and newborns, interpretation of results should be based  on gestational age, weight and in agreement with clinical  observations.    Premature Infant recommended reference ranges:  Up to 24 hours.............<8.0 mg/dL  Up to 48 hours............<12.0 mg/dL  3-5 days..................<15.0 mg/dL  6-29 days.................<15.0 mg/dL       Alkaline Phosphatase   Date Value Ref Range Status   03/16/2024 126 55 - 135 U/L Final     AST   Date Value Ref Range Status   03/16/2024 16 10 - 40 U/L Final     ALT   Date Value Ref Range Status   03/16/2024 12 10 - 44 U/L Final     Anion Gap   Date Value Ref Range Status   03/16/2024 8 8 - 16 mmol/L Final     eGFR   Date Value Ref  Range Status   03/16/2024 >60.0 >60 mL/min/1.73 m^2 Final          Assessment and Plan     1. Anxiety about health    2. Calculus of gallbladder without cholecystitis without obstruction        Discussion with patient.  Reviewed images of the CT scan with the patient.  Significant gallbladder wall thickening noted.  Uncertain as to the etiology of this.  Would recommend proceeding with MRCP.  We will make further recommendations based on the results of this.         No follow-ups on file.

## 2024-03-22 ENCOUNTER — TELEPHONE (OUTPATIENT)
Dept: SURGERY | Facility: CLINIC | Age: 54
End: 2024-03-22
Payer: COMMERCIAL

## 2024-03-22 ENCOUNTER — TELEPHONE (OUTPATIENT)
Dept: FAMILY MEDICINE | Facility: CLINIC | Age: 54
End: 2024-03-22
Payer: COMMERCIAL

## 2024-03-22 DIAGNOSIS — D75.839 THROMBOCYTOSIS: ICD-10-CM

## 2024-03-22 DIAGNOSIS — D64.9 ANEMIA, UNSPECIFIED TYPE: Primary | ICD-10-CM

## 2024-03-22 NOTE — TELEPHONE ENCOUNTER
----- Message from Kai Tobin MD sent at 3/19/2024  4:03 PM CDT -----  Blood test shows borderline anemia.  Platelets elevated as well.  Potassium level minimal elevation.  Cholesterol looks good.  Recommend check iron, TIBC, ferritin, CBC, reticulocyte count, B12, folate in 3 months. My nurse will contact you to arrange.  Thanks,  Dr. Tobin

## 2024-03-22 NOTE — TELEPHONE ENCOUNTER
I called patient to inform her that we scheduled her for a MRCP @ [facility] on Sunday, 3/31/24 @ 11am.  Patient was informed to arrive @ 10:30am and to fast for 4hrs prior.  She stated that she cannot go on this day.  I rescheduled her toThursday, 4/11/24 @ 2:30pm in [place].  I informed her to arrive @ 2pm and to fast 4hrs prior. Patient understood.  [facility]

## 2024-04-11 ENCOUNTER — HOSPITAL ENCOUNTER (OUTPATIENT)
Dept: RADIOLOGY | Facility: HOSPITAL | Age: 54
Discharge: HOME | End: 2024-04-11
Attending: SURGERY
Payer: COMMERCIAL

## 2024-04-11 DIAGNOSIS — K80.20 CALCULUS OF GALLBLADDER WITHOUT CHOLECYSTITIS WITHOUT OBSTRUCTION: ICD-10-CM

## 2024-04-11 PROCEDURE — 74183 MRI ABD W/O CNTR FLWD CNTR: CPT | Mod: TC,PO

## 2024-04-11 PROCEDURE — 76376 3D RENDER W/INTRP POSTPROCES: CPT | Mod: 26,,, | Performed by: STUDENT IN AN ORGANIZED HEALTH CARE EDUCATION/TRAINING PROGRAM

## 2024-04-11 PROCEDURE — 74183 MRI ABD W/O CNTR FLWD CNTR: CPT | Mod: 26,,, | Performed by: STUDENT IN AN ORGANIZED HEALTH CARE EDUCATION/TRAINING PROGRAM

## 2024-04-11 PROCEDURE — 25500020 PHARM REV CODE 255: Mod: PO | Performed by: SURGERY

## 2024-04-11 PROCEDURE — A9585 GADOBUTROL INJECTION: HCPCS | Mod: PO | Performed by: SURGERY

## 2024-04-11 PROCEDURE — 76376 3D RENDER W/INTRP POSTPROCES: CPT | Mod: TC,PO

## 2024-04-11 RX ORDER — GADOBUTROL 604.72 MG/ML
6 INJECTION INTRAVENOUS
Status: COMPLETED | OUTPATIENT
Start: 2024-04-11 | End: 2024-04-11

## 2024-04-11 RX ADMIN — GADOBUTROL 6 ML: 604.72 INJECTION INTRAVENOUS at 03:04

## 2024-04-19 ENCOUNTER — TELEPHONE (OUTPATIENT)
Dept: SURGERY | Facility: CLINIC | Age: 54
End: 2024-04-19
Payer: COMMERCIAL

## 2024-04-19 NOTE — TELEPHONE ENCOUNTER
----- Message from Miroslava Englewood sent at 4/19/2024 11:20 AM CDT -----  Contact: self  Type:  Needs Medical Advice    Who Called: self  Symptoms (please be specific): pt needs results from her MRI that was done on 4/11.     Would the patient rather a call back or a response via [facility]? call  Best Call Back Number: [phone] (home)     Additional Information: please advise and thank you.

## 2024-04-19 NOTE — TELEPHONE ENCOUNTER
I called patient to inform her that I'll forward her message to Dr. Tee and I'll call her back with the results.  Patient understood.  [facility]

## 2024-04-23 DIAGNOSIS — K82.8 THICKENING OF WALL OF GALLBLADDER WITH PERICHOLECYSTIC FLUID: Primary | ICD-10-CM

## 2024-04-24 ENCOUNTER — TELEPHONE (OUTPATIENT)
Dept: SURGERY | Facility: CLINIC | Age: 54
End: 2024-04-24
Payer: COMMERCIAL

## 2024-04-25 ENCOUNTER — HOSPITAL ENCOUNTER (OUTPATIENT)
Dept: RADIOLOGY | Facility: HOSPITAL | Age: 54
Discharge: HOME | End: 2024-04-25
Attending: SURGERY
Payer: COMMERCIAL

## 2024-04-25 DIAGNOSIS — K82.8 THICKENING OF WALL OF GALLBLADDER WITH PERICHOLECYSTIC FLUID: ICD-10-CM

## 2024-04-25 PROCEDURE — 74177 CT ABD & PELVIS W/CONTRAST: CPT | Mod: 26,,, | Performed by: STUDENT IN AN ORGANIZED HEALTH CARE EDUCATION/TRAINING PROGRAM

## 2024-04-25 PROCEDURE — A9698 NON-RAD CONTRAST MATERIALNOC: HCPCS | Mod: PO | Performed by: SURGERY

## 2024-04-25 PROCEDURE — 74177 CT ABD & PELVIS W/CONTRAST: CPT | Mod: TC,PO

## 2024-04-25 PROCEDURE — 25500020 PHARM REV CODE 255: Mod: PO | Performed by: SURGERY

## 2024-04-25 PROCEDURE — 71260 CT THORAX DX C+: CPT | Mod: 26,,, | Performed by: STUDENT IN AN ORGANIZED HEALTH CARE EDUCATION/TRAINING PROGRAM

## 2024-04-25 RX ADMIN — IOHEXOL 1000 ML: 9 SOLUTION ORAL at 11:04

## 2024-04-25 RX ADMIN — IOHEXOL 75 ML: 350 INJECTION, SOLUTION INTRAVENOUS at 11:04

## 2024-04-30 NOTE — H&P (VIEW-ONLY)
Kya Avitia is a 53yo F with a new gallbladder mass discovered on imaging after 2 episodes of self-limited pain last month.    CT 4/25/2024:  Impression:     1. Diffuse irregular edematous gallbladder wall thickening and enhancement, similar to prior MRI.  No significant pericholecystic edema or inflammatory changes on today's exam.  Findings could represent sequela of cholecystitis or gallbladder neoplasm.  2. Multiple noncalcified gallstones.  3. Stable mild intra and extrahepatic biliary ductal dilatation.  4. Stable pancreatic tail lesion with similar enhancement to the adjacent spleen, possible intrapancreatic splenule.  Confirmation can be obtained with nuclear medicine tagged red blood cell scan or sulfur colloid scan with SPECT CT.  5. Additional findings as above.        CA 19-9: 22.8 (wnl)  LFTs wnl    A/P:  53yo F with gallbladder wall thickening, chronic cholecystitis vs cancer. We discussed robotic v open cholecystectomy with intraoperative frozen section examination to determine potential liver resection and node dissection. We discussed the potential risk of both liver resection and melissa dissection, including but not limited to bleeding, continued diagnostic uncertainty, and biliary injury, as well as the risk of positive margins if this is an infiltrative gallbladder malignancy.     I spent 45 minutes with Ms. Chang, with >50% of time spent face to face and additional time preparing to see the patient (eg, review of tests), obtaining and/or reviewing separately obtained history, documenting clinical information in the electronic or other health record, independently interpreting results (not separately reported) and communicating results to the patient/family/caregiver, or Care coordination (not separately reported).         Avtar De Leon MD  Upper GI / Hepatobiliary Surgical Oncology  [facility] [place], [place]  Office: [phone]  Fax: [phone]

## 2024-04-30 NOTE — PROGRESS NOTES
Kya Avitia is a 53yo F with a new gallbladder mass discovered on imaging after 2 episodes of self-limited pain last month.    CT 4/25/2024:  Impression:     1. Diffuse irregular edematous gallbladder wall thickening and enhancement, similar to prior MRI.  No significant pericholecystic edema or inflammatory changes on today's exam.  Findings could represent sequela of cholecystitis or gallbladder neoplasm.  2. Multiple noncalcified gallstones.  3. Stable mild intra and extrahepatic biliary ductal dilatation.  4. Stable pancreatic tail lesion with similar enhancement to the adjacent spleen, possible intrapancreatic splenule.  Confirmation can be obtained with nuclear medicine tagged red blood cell scan or sulfur colloid scan with SPECT CT.  5. Additional findings as above.        CA 19-9: 22.8 (wnl)  LFTs wnl    A/P:  53yo F with gallbladder wall thickening, chronic cholecystitis vs cancer. We discussed robotic v open cholecystectomy with intraoperative frozen section examination to determine potential liver resection and node dissection. We discussed the potential risk of both liver resection and melissa dissection, including but not limited to bleeding, continued diagnostic uncertainty, and biliary injury, as well as the risk of positive margins if this is an infiltrative gallbladder malignancy.     I spent 45 minutes with Ms. Chang, with >50% of time spent face to face and additional time preparing to see the patient (eg, review of tests), obtaining and/or reviewing separately obtained history, documenting clinical information in the electronic or other health record, independently interpreting results (not separately reported) and communicating results to the patient/family/caregiver, or Care coordination (not separately reported).         Avtar De Leon MD  Upper GI / Hepatobiliary Surgical Oncology  [facility] [place], [place]  Office: [phone]  Fax: [phone]

## 2024-05-01 ENCOUNTER — OFFICE VISIT (OUTPATIENT)
Dept: HEMATOLOGY/ONCOLOGY | Facility: CLINIC | Age: 54
End: 2024-05-01
Payer: COMMERCIAL

## 2024-05-01 VITALS
BODY MASS INDEX: 21.38 KG/M2 | HEART RATE: 85 BPM | HEIGHT: 65 IN | DIASTOLIC BLOOD PRESSURE: 82 MMHG | OXYGEN SATURATION: 98 % | SYSTOLIC BLOOD PRESSURE: 116 MMHG | WEIGHT: 128.31 LBS

## 2024-05-01 DIAGNOSIS — K82.8 THICKENING OF WALL OF GALLBLADDER WITH PERICHOLECYSTIC FLUID: ICD-10-CM

## 2024-05-01 PROCEDURE — 3074F SYST BP LT 130 MM HG: CPT | Mod: CPTII,S$GLB,, | Performed by: SURGERY

## 2024-05-01 PROCEDURE — 1159F MED LIST DOCD IN RCRD: CPT | Mod: CPTII,S$GLB,, | Performed by: SURGERY

## 2024-05-01 PROCEDURE — 3008F BODY MASS INDEX DOCD: CPT | Mod: CPTII,S$GLB,, | Performed by: SURGERY

## 2024-05-01 PROCEDURE — 99999 PR PBB SHADOW E&M-EST. PATIENT-LVL V: CPT | Mod: PBBFAC,,, | Performed by: SURGERY

## 2024-05-01 PROCEDURE — 3079F DIAST BP 80-89 MM HG: CPT | Mod: CPTII,S$GLB,, | Performed by: SURGERY

## 2024-05-01 PROCEDURE — 99204 OFFICE O/P NEW MOD 45 MIN: CPT | Mod: S$GLB,,, | Performed by: SURGERY

## 2024-05-01 NOTE — PROGRESS NOTES
Met with pt. Introduction and contact information provided.  Confirmed procedure for 5/14/2024.  Informed to arrive at the [facility] on the 2nd floor [address] [facility] for 2 hours prior to  surgery time. RN will call  day before to confirm surgery and arrival time. Surgery Instructions provided as follows:  instructed to remain NPO solids 8 hours prior to surgery, clear liquids until 2 hours prior to surgery, to shower with hibiclens/antibacterial soap the night before surgery and morning of surgery before arrival, not to apply any lotions, powders or deodorant, remove all metal and jewelry, to wear comfortable clothes, and leave all valuables at home. Medications reviewed and  instructed to bring medications on DOS. Pre op department will call to review medications and advise if medications need to be taken day of surgery. Confirmed pt  will have transportation home and spouse will accompany pt on DOS.   Post operative instructions reviewed. Informed pt likelihood post op appt will be scheduled at [facility]. VV appt scheduled for 5/10. Time date and VV instructions provided. Pt provided  [facility] [place] guide,  disability instructions, [facility] voucher,  and  instructed to bring surgery folder  on DOS. Pt   verbalized understanding to all of the above and instructed to contact us for any questions.

## 2024-05-07 ENCOUNTER — TELEPHONE (OUTPATIENT)
Dept: SURGERY | Facility: CLINIC | Age: 54
End: 2024-05-07
Payer: COMMERCIAL

## 2024-05-07 NOTE — TELEPHONE ENCOUNTER
----- Message from Woodrow Hackett sent at 5/7/2024  1:19 PM CDT -----  Regarding: Appt  Contact: [phone]  Missed Callback         Pt returning callback from missed call. Requesting to speak with somebody in Dr. De Leon  office regarding 5/14. Please call [phone]

## 2024-05-07 NOTE — TELEPHONE ENCOUNTER
Returned call. Spoke to pt. Pt inquiring about surgery arrival time. Informed her tentative arrival time is 1000 and surgery time is noon. RN will call day before to confirm. Pt verbalized understanding.

## 2024-05-10 ENCOUNTER — OFFICE VISIT (OUTPATIENT)
Dept: SURGERY | Facility: CLINIC | Age: 54
End: 2024-05-10
Payer: COMMERCIAL

## 2024-05-10 ENCOUNTER — PATIENT MESSAGE (OUTPATIENT)
Dept: FAMILY MEDICINE | Facility: CLINIC | Age: 54
End: 2024-05-10
Payer: COMMERCIAL

## 2024-05-10 DIAGNOSIS — K80.20 CALCULUS OF GALLBLADDER WITHOUT CHOLECYSTITIS WITHOUT OBSTRUCTION: Primary | ICD-10-CM

## 2024-05-10 PROCEDURE — 99212 OFFICE O/P EST SF 10 MIN: CPT | Mod: 95,,,

## 2024-05-10 NOTE — PROGRESS NOTES
The patient location is: [place]  The chief complaint leading to consultation is: surgical optimization     Visit type: audiovisual    Face to Face time with patient: 7 minutes   25 minutes of total time spent on the encounter, which includes face to face time and non-face to face time preparing to see the patient (eg, review of tests), Obtaining and/or reviewing separately obtained history, Documenting clinical information in the electronic or other health record, Independently interpreting results (not separately reported) and communicating results to the patient/family/caregiver, or Care coordination (not separately reported).     Each patient to whom he or she provides medical services by telemedicine is:  (1) informed of the relationship between the physician and patient and the respective role of any other health care provider with respect to management of the patient; and (2) notified that he or she may decline to receive medical services by telemedicine and may withdraw from such care at any time.  Encounter Date:  5/10/2024    Patient ID: Kya Avitia  Age:  54 y.o. :  1970    Chief Complaint:  surgical optimization      Interval History:  Ms. Avitia  remains at home for a virtual visit for surgical optimization prior to cholecystectomy vs possible radical david on 24 with Dr. De Leon for a gallbladder mass. She is doing well and has no complaints. Denies fever, chills, N/V/D, SOB, chest pain.      New Data:  Labs:    Lab Results   Component Value Date    WBC 5.65 2024    HGB 12.6 2024    HCT 36.8 (L) 2024    MCV 86 2024     2024       Chemistry        Component Value Date/Time     (L) 2024 1003    K 5.3 (H) 2024 1003    CL 99 2024 1003    CO2 27 2024 1003    BUN 12 2024 1003    CREATININE 0.9 2024 1003    GLU 92 2024 1003        Component Value Date/Time    CALCIUM 9.5 2024 1003    ALKPHOS 98  04/25/2024 1003    AST 23 04/25/2024 1003    ALT 12 04/25/2024 1003    BILITOT 0.4 04/25/2024 1003    ESTGFRAFRICA >60.0 11/06/2021 1000    EGFRNONAA >60.0 11/06/2021 1000        Past Medical History:   Diagnosis Date    Allergy     Chronic back pain 11/15/2018    Chronic neck pain     Chronically on opiate therapy     Depression     Kidney stone      Past Surgical History:   Procedure Laterality Date    LEEP       Current Outpatient Medications on File Prior to Visit   Medication Sig Dispense Refill    ALPRAZolam (XANAX) 0.25 MG tablet Take 2 tablets (0.5 mg total) by mouth 3 (three) times daily. for 2 days 10 tablet 0    cyclobenzaprine (FLEXERIL) 10 MG tablet Uses prn      dicyclomine (BENTYL) 20 mg tablet Take 20 mg by mouth every 6 (six) hours. As needed. (Patient not taking: Reported on 5/1/2024)      estradiol (ESTRACE) 1 MG tablet Take 1 mg by mouth once daily.  6    FLUoxetine 20 MG capsule Take 3 capsules (60 mg total) by mouth once daily. 270 capsule 3    medroxyPROGESTERone (PROVERA) 2.5 MG tablet Take 2.5 mg by mouth once daily.      polyethylene glycol (GLYCOLAX) 17 gram/dose powder Take 17 g by mouth once daily. Mix in water or juice 510 g prn    zolpidem (AMBIEN) 5 MG Tab Take 1 tablet (5 mg total) by mouth nightly as needed (insomnia). (Patient not taking: Reported on 3/21/2024) 30 tablet 5     No current facility-administered medications on file prior to visit.     Review of patient's allergies indicates:   Allergen Reactions    No known drug allergies      Family History:  Her family history includes Breast cancer in her maternal grandmother.     Social History:   reports that she has never smoked. She has never been exposed to tobacco smoke. She has never used smokeless tobacco. She reports current alcohol use of about 5.8 standard drinks of alcohol per week. She reports that she does not use drugs.     ROS:     Review of Systems   Constitutional: Negative.    HENT: Negative.     Respiratory:  Negative.     Cardiovascular: Negative.    Gastrointestinal: Negative.    Genitourinary: Negative.    Musculoskeletal: Negative.    Skin: Negative.    Neurological: Negative.      Pertinent positive/negatives detailed in HPI, all other systems negative.     Physical Exam: Virtual Visit Only   There were no vitals taken for this visit.      ICD-10-CM ICD-9-CM    1. Calculus of gallbladder without cholecystitis without obstruction  K80.20 574.20       Plan    Ms. Avitia  remains at home for a virtual visit for surgical optimization prior to cholecystectomy vs possible radical david on 5/14/24 with Dr. De Leon for a gallbladder mass. She is doing well and has no complaints. Labs and medications reviewed. We discussed the procedure in detail.   We discussed the hospital stay, post op care, and outpatient recovery.  I have explained the risks, benefits, and alternatives of the procedure in detail. The patient voices understanding and all questions have been answered. The patient agrees to proceed as planned.   Questions were asked and answered to patient's satisfaction.  We discussed the need for continued clinical/radiographic/endoscopic follow-up.    Follow up if symptoms worsen or fail to improve, for post operative/hospital discharge.              Gretchen Hitchcock NP  Upper GI / Hepatobiliary Surgical Oncology  [facility] [place], [place]  Office: [phone]  Fax: [phone]

## 2024-05-13 ENCOUNTER — TELEPHONE (OUTPATIENT)
Dept: SURGERY | Facility: CLINIC | Age: 54
End: 2024-05-13
Payer: COMMERCIAL

## 2024-05-13 ENCOUNTER — ANESTHESIA EVENT (OUTPATIENT)
Dept: SURGERY | Facility: HOSPITAL | Age: 54
DRG: 415 | End: 2024-05-13
Payer: COMMERCIAL

## 2024-05-13 ENCOUNTER — PATIENT MESSAGE (OUTPATIENT)
Dept: HEMATOLOGY/ONCOLOGY | Facility: CLINIC | Age: 54
End: 2024-05-13
Payer: COMMERCIAL

## 2024-05-13 DIAGNOSIS — K80.20 CALCULUS OF GALLBLADDER WITHOUT CHOLECYSTITIS WITHOUT OBSTRUCTION: Primary | ICD-10-CM

## 2024-05-13 RX ORDER — HEPARIN SODIUM 5000 [USP'U]/ML
5000 INJECTION, SOLUTION INTRAVENOUS; SUBCUTANEOUS
Status: CANCELLED | OUTPATIENT
Start: 2024-05-13

## 2024-05-13 RX ORDER — CELECOXIB 200 MG/1
400 CAPSULE ORAL
Status: CANCELLED | OUTPATIENT
Start: 2024-05-13

## 2024-05-13 RX ORDER — ACETAMINOPHEN 500 MG
1000 TABLET ORAL
Status: CANCELLED | OUTPATIENT
Start: 2024-05-13

## 2024-05-13 RX ORDER — METRONIDAZOLE 500 MG/100ML
500 INJECTION, SOLUTION INTRAVENOUS
Status: CANCELLED | OUTPATIENT
Start: 2024-05-13

## 2024-05-13 RX ORDER — CEFAZOLIN SODIUM 2 G/50ML
2 SOLUTION INTRAVENOUS
Status: CANCELLED | OUTPATIENT
Start: 2024-05-13

## 2024-05-13 NOTE — TELEPHONE ENCOUNTER
Received incoming call. Spoke to pt. Confirmed procedure for 5/14/2024 with Dr. De Leon.   Informed [facility] on the 2nd floor [address] for [address]  and surgery time is 1200. Surgery Instructions provided as follows:  instructed to remain NPO solids 8 hours prior to surgery, clear liquids until 2 hours prior to surgery, to shower with hibiclens/antibacterial soap the night before surgery and morning of surgery before arrival, not to apply any lotions, powders or deodorant, remove all metal and jewelry, to wear comfortable clothes, and leave all valuables at home. Medications reviewed and  instructed to bring medications on DOS.  Confirmed pt  will have transportation home and spouse will accompany pt on DOS.   Post operative instructions reviewed. Pt instructed to bring surgery folder  on DOS. Pt  verbalized understanding to all of the above and instructed to contact us for any questions.

## 2024-05-13 NOTE — TELEPHONE ENCOUNTER
Call placed to pt to confirm surgery DOS 5-. Left detailed message with arrival time 1000, surgery time noon, and NPO instructions. Requested pt to call back. Call back number provided.

## 2024-05-13 NOTE — ANESTHESIA PREPROCEDURE EVALUATION
[facility]  Anesthesia Pre-Operative Evaluation         Patient Name: Kya Avitia  YOB: 1970  MRN: [id number]    SUBJECTIVE:     Pre-operative evaluation for Procedure(s) (LRB):  XI ROBOTIC CHOLECYSTECTOMY, possible radical cholecystectomy (N/A)     05/13/2024    Kya Avitia is a 54 y.o. female w/ a significant PMHx of depression, gallbladder mass concerning for chronic cholecystitis vs cancer.    Patient now presents for the above procedure(s).      LDA: None documented.    Prev airway: None documented.    Drips: None documented.    Patient Active Problem List   Diagnosis    Recurrent major depressive disorder, in full remission    Chronic neck pain    Chronic back pain       Review of patient's allergies indicates:   Allergen Reactions    No known drug allergies        Current Outpatient Medications:  No current facility-administered medications for this encounter.    Current Outpatient Medications:     ALPRAZolam (XANAX) 0.25 MG tablet, Take 2 tablets (0.5 mg total) by mouth 3 (three) times daily. for 2 days, Disp: 10 tablet, Rfl: 0    cyclobenzaprine (FLEXERIL) 10 MG tablet, Uses prn, Disp: , Rfl:     dicyclomine (BENTYL) 20 mg tablet, Take 20 mg by mouth every 6 (six) hours. As needed. (Patient not taking: Reported on 5/1/2024), Disp: , Rfl:     estradiol (ESTRACE) 1 MG tablet, Take 1 mg by mouth once daily., Disp: , Rfl: 6    FLUoxetine 20 MG capsule, Take 3 capsules (60 mg total) by mouth once daily., Disp: 270 capsule, Rfl: 3    medroxyPROGESTERone (PROVERA) 2.5 MG tablet, Take 2.5 mg by mouth once daily., Disp: , Rfl:     polyethylene glycol (GLYCOLAX) 17 gram/dose powder, Take 17 g by mouth once daily. Mix in water or juice, Disp: 510 g, Rfl: prn    zolpidem (AMBIEN) 5 MG Tab, Take 1 tablet (5 mg total) by mouth nightly as needed (insomnia). (Patient not taking: Reported on 3/21/2024), Disp: 30 tablet, Rfl: 5    Past Surgical History:   Procedure Laterality Date     LEEP         Social History     Socioeconomic History    Marital status:    Tobacco Use    Smoking status: Never     Passive exposure: Never    Smokeless tobacco: Never   Substance and Sexual Activity    Alcohol use: Yes     Alcohol/week: 5.8 standard drinks of alcohol     Types: 7 Standard drinks or equivalent per week    Drug use: No       OBJECTIVE:     Vital Signs Range (Last 24H):         Significant Labs:  Lab Results   Component Value Date    WBC 5.65 04/25/2024    HGB 12.6 04/25/2024    HCT 36.8 (L) 04/25/2024     04/25/2024    CHOL 140 03/16/2024    TRIG 48 03/16/2024    HDL 46 03/16/2024    ALT 12 04/25/2024    AST 23 04/25/2024     (L) 04/25/2024    K 5.3 (H) 04/25/2024    CL 99 04/25/2024    CREATININE 0.9 04/25/2024    BUN 12 04/25/2024    CO2 27 04/25/2024    TSH 1.597 11/06/2021       Diagnostic Studies: No relevant studies.    EKG:   No results found for this or any previous visit.    2D ECHO:  TTE:  No results found for this or any previous visit.    LIZ:  No results found for this or any previous visit.    ASSESSMENT/PLAN:           Pre-op Assessment    I have reviewed the Patient Summary Reports.       I have reviewed the Medications.     Review of Systems  Anesthesia Hx:  No problems with previous Anesthesia   History of prior surgery of interest to airway management or planning:          Denies Family Hx of Anesthesia complications.    Denies Personal Hx of Anesthesia complications.                    Social:  Non-Smoker       Hematology/Oncology:  Hematology Normal   Oncology Normal                                   Cardiovascular:  Cardiovascular Normal                                            Pulmonary:  Pulmonary Normal                       Renal/:  Chronic Renal Disease                Hepatic/GI:  Hepatic/GI Normal                 Musculoskeletal:  Musculoskeletal Normal                Neurological:  Neurology Normal                                       Endocrine:  Endocrine Normal            Psych:  Psychiatric History  depression                Physical Exam  General: Well nourished, Cooperative, Alert and Oriented    Airway:  Mallampati: II   Mouth Opening: Normal  TM Distance: Normal  Neck ROM: Normal ROM    Dental:  Intact        Anesthesia Plan  Type of Anesthesia, risks & benefits discussed:    Anesthesia Type: Gen ETT  Intra-op Monitoring Plan: Standard ASA Monitors  Post Op Pain Control Plan: multimodal analgesia and IV/PO Opioids PRN  Induction:  IV  Airway Plan: Direct, Post-Induction  Informed Consent: Informed consent signed with the Patient representative and all parties understand the risks and agree with anesthesia plan.  All questions answered.   ASA Score: 2  Day of Surgery Review of History & Physical: H&P Update referred to the surgeon/provider.    Ready For Surgery From Anesthesia Perspective.     .

## 2024-05-13 NOTE — PRE-PROCEDURE INSTRUCTIONS
PreOp Instructions given:   - Verbal medication information (what to hold and what to take)   - NPO guidelines 2400  - Arrival place directions given; time to be given the day before procedure by the   Surgeon's Office 1000 DOSC  - Bathing with antibacterial soap   - Don't wear any jewelry or bring any valuables AM of surgery   - No makeup or moisturizer to face   - No perfume/cologne, powder, lotions or aftershave   Pt. verbalized understanding.   Pt denies any h/o Anesthesia/Sedation complications or side effects.

## 2024-05-14 ENCOUNTER — ANESTHESIA (OUTPATIENT)
Dept: SURGERY | Facility: HOSPITAL | Age: 54
DRG: 415 | End: 2024-05-14
Payer: COMMERCIAL

## 2024-05-14 ENCOUNTER — OFFICE VISIT (OUTPATIENT)
Dept: FAMILY MEDICINE | Facility: CLINIC | Age: 54
End: 2024-05-14
Payer: COMMERCIAL

## 2024-05-14 ENCOUNTER — HOSPITAL ENCOUNTER (INPATIENT)
Facility: HOSPITAL | Age: 54
LOS: 1 days | Discharge: HOME | DRG: 415 | End: 2024-05-16
Attending: SURGERY | Admitting: SURGERY
Payer: COMMERCIAL

## 2024-05-14 DIAGNOSIS — F41.9 ANXIETY: Primary | ICD-10-CM

## 2024-05-14 DIAGNOSIS — K80.20 CALCULUS OF GALLBLADDER WITHOUT CHOLECYSTITIS WITHOUT OBSTRUCTION: ICD-10-CM

## 2024-05-14 PROBLEM — K82.8 THICKENING OF WALL OF GALLBLADDER WITH PERICHOLECYSTIC FLUID: Status: ACTIVE | Noted: 2024-05-14

## 2024-05-14 LAB
ABO + RH BLD: NORMAL
ALBUMIN SERPL BCP-MCNC: 3.3 G/DL (ref 3.5–5.2)
ALP SERPL-CCNC: 85 U/L (ref 55–135)
ALT SERPL W/O P-5'-P-CCNC: 107 U/L (ref 10–44)
ANION GAP SERPL CALC-SCNC: 9 MMOL/L (ref 8–16)
AST SERPL-CCNC: 242 U/L (ref 10–40)
BASOPHILS # BLD AUTO: 0.01 K/UL (ref 0–0.2)
BASOPHILS NFR BLD: 0.1 % (ref 0–1.9)
BILIRUB SERPL-MCNC: 0.5 MG/DL (ref 0.1–1)
BLD GP AB SCN CELLS X3 SERPL QL: NORMAL
BUN SERPL-MCNC: 9 MG/DL (ref 6–20)
CALCIUM SERPL-MCNC: 8.1 MG/DL (ref 8.7–10.5)
CHLORIDE SERPL-SCNC: 104 MMOL/L (ref 95–110)
CO2 SERPL-SCNC: 20 MMOL/L (ref 23–29)
CREAT SERPL-MCNC: 0.8 MG/DL (ref 0.5–1.4)
DIFFERENTIAL METHOD BLD: ABNORMAL
EOSINOPHIL # BLD AUTO: 0 K/UL (ref 0–0.5)
EOSINOPHIL NFR BLD: 0 % (ref 0–8)
ERYTHROCYTE [DISTWIDTH] IN BLOOD BY AUTOMATED COUNT: 15 % (ref 11.5–14.5)
EST. GFR  (NO RACE VARIABLE): >60 ML/MIN/1.73 M^2
GLUCOSE SERPL-MCNC: 167 MG/DL (ref 70–110)
HCT VFR BLD AUTO: 36.8 % (ref 37–48.5)
HGB BLD-MCNC: 12.3 G/DL (ref 12–16)
IMM GRANULOCYTES # BLD AUTO: 0.08 K/UL (ref 0–0.04)
IMM GRANULOCYTES NFR BLD AUTO: 0.5 % (ref 0–0.5)
LYMPHOCYTES # BLD AUTO: 0.3 K/UL (ref 1–4.8)
LYMPHOCYTES NFR BLD: 2.2 % (ref 18–48)
MAGNESIUM SERPL-MCNC: 1.6 MG/DL (ref 1.6–2.6)
MCH RBC QN AUTO: 29.3 PG (ref 27–31)
MCHC RBC AUTO-ENTMCNC: 33.4 G/DL (ref 32–36)
MCV RBC AUTO: 88 FL (ref 82–98)
MONOCYTES # BLD AUTO: 0.6 K/UL (ref 0.3–1)
MONOCYTES NFR BLD: 4.1 % (ref 4–15)
NEUTROPHILS # BLD AUTO: 14.4 K/UL (ref 1.8–7.7)
NEUTROPHILS NFR BLD: 93.1 % (ref 38–73)
NRBC BLD-RTO: 0 /100 WBC
PHOSPHATE SERPL-MCNC: 2.6 MG/DL (ref 2.7–4.5)
PLATELET # BLD AUTO: 337 K/UL (ref 150–450)
PMV BLD AUTO: 8.7 FL (ref 9.2–12.9)
POTASSIUM SERPL-SCNC: 3.9 MMOL/L (ref 3.5–5.1)
PROT SERPL-MCNC: 6.6 G/DL (ref 6–8.4)
RBC # BLD AUTO: 4.2 M/UL (ref 4–5.4)
SODIUM SERPL-SCNC: 133 MMOL/L (ref 136–145)
WBC # BLD AUTO: 15.47 K/UL (ref 3.9–12.7)

## 2024-05-14 PROCEDURE — 84100 ASSAY OF PHOSPHORUS: CPT | Performed by: STUDENT IN AN ORGANIZED HEALTH CARE EDUCATION/TRAINING PROGRAM

## 2024-05-14 PROCEDURE — 88305 TISSUE EXAM BY PATHOLOGIST: CPT | Mod: 59 | Performed by: PATHOLOGY

## 2024-05-14 PROCEDURE — 25000003 PHARM REV CODE 250: Performed by: SURGERY

## 2024-05-14 PROCEDURE — 0FJ44ZZ INSPECTION OF GALLBLADDER, PERCUTANEOUS ENDOSCOPIC APPROACH: ICD-10-PCS | Performed by: SURGERY

## 2024-05-14 PROCEDURE — D9220A PRA ANESTHESIA: Mod: ANES,,, | Performed by: ANESTHESIOLOGY

## 2024-05-14 PROCEDURE — 63600175 PHARM REV CODE 636 W HCPCS: Performed by: STUDENT IN AN ORGANIZED HEALTH CARE EDUCATION/TRAINING PROGRAM

## 2024-05-14 PROCEDURE — 8E0W4CZ ROBOTIC ASSISTED PROCEDURE OF TRUNK REGION, PERCUTANEOUS ENDOSCOPIC APPROACH: ICD-10-PCS | Performed by: SURGERY

## 2024-05-14 PROCEDURE — 0FT40ZZ RESECTION OF GALLBLADDER, OPEN APPROACH: ICD-10-PCS | Performed by: SURGERY

## 2024-05-14 PROCEDURE — 36415 COLL VENOUS BLD VENIPUNCTURE: CPT

## 2024-05-14 PROCEDURE — 36000711: Performed by: SURGERY

## 2024-05-14 PROCEDURE — 63600175 PHARM REV CODE 636 W HCPCS

## 2024-05-14 PROCEDURE — 25000003 PHARM REV CODE 250: Performed by: NURSE ANESTHETIST, CERTIFIED REGISTERED

## 2024-05-14 PROCEDURE — 25000003 PHARM REV CODE 250

## 2024-05-14 PROCEDURE — 83735 ASSAY OF MAGNESIUM: CPT | Performed by: STUDENT IN AN ORGANIZED HEALTH CARE EDUCATION/TRAINING PROGRAM

## 2024-05-14 PROCEDURE — 27201423 OPTIME MED/SURG SUP & DEVICES STERILE SUPPLY: Performed by: SURGERY

## 2024-05-14 PROCEDURE — 25000003 PHARM REV CODE 250: Performed by: STUDENT IN AN ORGANIZED HEALTH CARE EDUCATION/TRAINING PROGRAM

## 2024-05-14 PROCEDURE — 63600175 PHARM REV CODE 636 W HCPCS: Mod: JZ,JG

## 2024-05-14 PROCEDURE — 88331 PATH CONSLTJ SURG 1 BLK 1SPC: CPT | Mod: 26,,, | Performed by: PATHOLOGY

## 2024-05-14 PROCEDURE — 94761 N-INVAS EAR/PLS OXIMETRY MLT: CPT

## 2024-05-14 PROCEDURE — 71000016 HC POSTOP RECOV ADDL HR: Performed by: SURGERY

## 2024-05-14 PROCEDURE — 88332 PATH CONSLTJ SURG EA ADD BLK: CPT | Mod: 59 | Performed by: PATHOLOGY

## 2024-05-14 PROCEDURE — 71000033 HC RECOVERY, INTIAL HOUR: Performed by: SURGERY

## 2024-05-14 PROCEDURE — 47600 CHOLECYSTECTOMY: CPT | Mod: 22,,, | Performed by: SURGERY

## 2024-05-14 PROCEDURE — 85025 COMPLETE CBC W/AUTO DIFF WBC: CPT | Performed by: STUDENT IN AN ORGANIZED HEALTH CARE EDUCATION/TRAINING PROGRAM

## 2024-05-14 PROCEDURE — 37000009 HC ANESTHESIA EA ADD 15 MINS: Performed by: SURGERY

## 2024-05-14 PROCEDURE — D9220A PRA ANESTHESIA: Mod: CRNA,,, | Performed by: NURSE ANESTHETIST, CERTIFIED REGISTERED

## 2024-05-14 PROCEDURE — 88305 TISSUE EXAM BY PATHOLOGIST: CPT | Mod: 26,,, | Performed by: PATHOLOGY

## 2024-05-14 PROCEDURE — 88304 TISSUE EXAM BY PATHOLOGIST: CPT | Mod: 59 | Performed by: PATHOLOGY

## 2024-05-14 PROCEDURE — 80053 COMPREHEN METABOLIC PANEL: CPT | Performed by: STUDENT IN AN ORGANIZED HEALTH CARE EDUCATION/TRAINING PROGRAM

## 2024-05-14 PROCEDURE — 37000008 HC ANESTHESIA 1ST 15 MINUTES: Performed by: SURGERY

## 2024-05-14 PROCEDURE — 36000710: Performed by: SURGERY

## 2024-05-14 PROCEDURE — 86920 COMPATIBILITY TEST SPIN: CPT

## 2024-05-14 PROCEDURE — 86850 RBC ANTIBODY SCREEN: CPT

## 2024-05-14 PROCEDURE — 36415 COLL VENOUS BLD VENIPUNCTURE: CPT | Performed by: STUDENT IN AN ORGANIZED HEALTH CARE EDUCATION/TRAINING PROGRAM

## 2024-05-14 PROCEDURE — 88331 PATH CONSLTJ SURG 1 BLK 1SPC: CPT | Performed by: PATHOLOGY

## 2024-05-14 PROCEDURE — 63600175 PHARM REV CODE 636 W HCPCS: Performed by: NURSE ANESTHETIST, CERTIFIED REGISTERED

## 2024-05-14 PROCEDURE — 99213 OFFICE O/P EST LOW 20 MIN: CPT | Mod: 95,,, | Performed by: FAMILY MEDICINE

## 2024-05-14 PROCEDURE — 71000015 HC POSTOP RECOV 1ST HR: Performed by: SURGERY

## 2024-05-14 PROCEDURE — 88332 PATH CONSLTJ SURG EA ADD BLK: CPT | Mod: 26,,, | Performed by: PATHOLOGY

## 2024-05-14 PROCEDURE — 27000221 HC OXYGEN, UP TO 24 HOURS

## 2024-05-14 PROCEDURE — 63600175 PHARM REV CODE 636 W HCPCS: Mod: JZ,JG | Performed by: SURGERY

## 2024-05-14 PROCEDURE — 0DN90ZZ RELEASE DUODENUM, OPEN APPROACH: ICD-10-PCS | Performed by: SURGERY

## 2024-05-14 PROCEDURE — 88304 TISSUE EXAM BY PATHOLOGIST: CPT | Mod: 26,,, | Performed by: PATHOLOGY

## 2024-05-14 PROCEDURE — 0DNK0ZZ RELEASE ASCENDING COLON, OPEN APPROACH: ICD-10-PCS | Performed by: SURGERY

## 2024-05-14 RX ORDER — HEPARIN SODIUM 5000 [USP'U]/ML
5000 INJECTION, SOLUTION INTRAVENOUS; SUBCUTANEOUS
Status: DISCONTINUED | OUTPATIENT
Start: 2024-05-14 | End: 2024-05-14

## 2024-05-14 RX ORDER — ALPRAZOLAM 0.5 MG/1
0.5 TABLET ORAL 3 TIMES DAILY PRN
Qty: 20 TABLET | Refills: 0 | Status: SHIPPED | OUTPATIENT
Start: 2024-05-14 | End: 2024-05-24 | Stop reason: SDUPTHER

## 2024-05-14 RX ORDER — FENTANYL CITRATE 50 UG/ML
INJECTION, SOLUTION INTRAMUSCULAR; INTRAVENOUS
Status: DISCONTINUED | OUTPATIENT
Start: 2024-05-14 | End: 2024-05-14

## 2024-05-14 RX ORDER — SODIUM CHLORIDE 9 MG/ML
INJECTION, SOLUTION INTRAVENOUS CONTINUOUS
Status: DISCONTINUED | OUTPATIENT
Start: 2024-05-14 | End: 2024-05-15

## 2024-05-14 RX ORDER — DEXAMETHASONE SODIUM PHOSPHATE 4 MG/ML
INJECTION, SOLUTION INTRA-ARTICULAR; INTRALESIONAL; INTRAMUSCULAR; INTRAVENOUS; SOFT TISSUE
Status: DISCONTINUED | OUTPATIENT
Start: 2024-05-14 | End: 2024-05-14

## 2024-05-14 RX ORDER — ENOXAPARIN SODIUM 100 MG/ML
40 INJECTION SUBCUTANEOUS EVERY 24 HOURS
Status: DISCONTINUED | OUTPATIENT
Start: 2024-05-15 | End: 2024-05-16 | Stop reason: HOSPADM

## 2024-05-14 RX ORDER — CELECOXIB 200 MG/1
400 CAPSULE ORAL
Status: DISCONTINUED | OUTPATIENT
Start: 2024-05-14 | End: 2024-05-14

## 2024-05-14 RX ORDER — ONDANSETRON 8 MG/1
8 TABLET, ORALLY DISINTEGRATING ORAL EVERY 8 HOURS PRN
Status: DISCONTINUED | OUTPATIENT
Start: 2024-05-14 | End: 2024-05-16 | Stop reason: HOSPADM

## 2024-05-14 RX ORDER — INDOCYANINE GREEN AND WATER 25 MG
KIT INJECTION
Status: DISCONTINUED | OUTPATIENT
Start: 2024-05-14 | End: 2024-05-14 | Stop reason: HOSPADM

## 2024-05-14 RX ORDER — MIDAZOLAM HYDROCHLORIDE 1 MG/ML
INJECTION INTRAMUSCULAR; INTRAVENOUS
Status: DISCONTINUED | OUTPATIENT
Start: 2024-05-14 | End: 2024-05-14

## 2024-05-14 RX ORDER — POLYETHYLENE GLYCOL 3350 17 G/17G
17 POWDER, FOR SOLUTION ORAL DAILY
Status: DISCONTINUED | OUTPATIENT
Start: 2024-05-15 | End: 2024-05-15

## 2024-05-14 RX ORDER — HYDROMORPHONE HCL IN 0.9% NACL 6 MG/30 ML
PATIENT CONTROLLED ANALGESIA SYRINGE INTRAVENOUS CONTINUOUS
Status: DISCONTINUED | OUTPATIENT
Start: 2024-05-14 | End: 2024-05-15

## 2024-05-14 RX ORDER — INDOCYANINE GREEN AND WATER 25 MG
KIT INJECTION
Status: DISCONTINUED | OUTPATIENT
Start: 2024-05-14 | End: 2024-05-14

## 2024-05-14 RX ORDER — HALOPERIDOL 5 MG/ML
0.5 INJECTION INTRAMUSCULAR EVERY 10 MIN PRN
Status: DISCONTINUED | OUTPATIENT
Start: 2024-05-14 | End: 2024-05-14 | Stop reason: HOSPADM

## 2024-05-14 RX ORDER — ROCURONIUM BROMIDE 10 MG/ML
INJECTION, SOLUTION INTRAVENOUS
Status: DISCONTINUED | OUTPATIENT
Start: 2024-05-14 | End: 2024-05-14

## 2024-05-14 RX ORDER — METRONIDAZOLE 500 MG/100ML
500 INJECTION, SOLUTION INTRAVENOUS
Status: DISCONTINUED | OUTPATIENT
Start: 2024-05-14 | End: 2024-05-14

## 2024-05-14 RX ORDER — PHENYLEPHRINE HCL IN 0.9% NACL 1 MG/10 ML
SYRINGE (ML) INTRAVENOUS
Status: DISCONTINUED | OUTPATIENT
Start: 2024-05-14 | End: 2024-05-14

## 2024-05-14 RX ORDER — LIDOCAINE HYDROCHLORIDE 20 MG/ML
INJECTION, SOLUTION EPIDURAL; INFILTRATION; INTRACAUDAL; PERINEURAL
Status: DISCONTINUED | OUTPATIENT
Start: 2024-05-14 | End: 2024-05-14

## 2024-05-14 RX ORDER — PROPOFOL 10 MG/ML
VIAL (ML) INTRAVENOUS
Status: DISCONTINUED | OUTPATIENT
Start: 2024-05-14 | End: 2024-05-14

## 2024-05-14 RX ORDER — HYDROMORPHONE HYDROCHLORIDE 1 MG/ML
0.2 INJECTION, SOLUTION INTRAMUSCULAR; INTRAVENOUS; SUBCUTANEOUS EVERY 5 MIN PRN
Status: DISCONTINUED | OUTPATIENT
Start: 2024-05-14 | End: 2024-05-14 | Stop reason: HOSPADM

## 2024-05-14 RX ORDER — ACETAMINOPHEN 10 MG/ML
1000 INJECTION, SOLUTION INTRAVENOUS EVERY 8 HOURS
Status: COMPLETED | OUTPATIENT
Start: 2024-05-14 | End: 2024-05-15

## 2024-05-14 RX ORDER — MUPIROCIN 20 MG/G
1 OINTMENT TOPICAL 2 TIMES DAILY
Status: DISCONTINUED | OUTPATIENT
Start: 2024-05-14 | End: 2024-05-16 | Stop reason: HOSPADM

## 2024-05-14 RX ORDER — DEXMEDETOMIDINE HYDROCHLORIDE 100 UG/ML
INJECTION, SOLUTION INTRAVENOUS
Status: DISCONTINUED | OUTPATIENT
Start: 2024-05-14 | End: 2024-05-14

## 2024-05-14 RX ORDER — BUPIVACAINE HYDROCHLORIDE 2.5 MG/ML
INJECTION, SOLUTION EPIDURAL; INFILTRATION; INTRACAUDAL
Status: DISCONTINUED | OUTPATIENT
Start: 2024-05-14 | End: 2024-05-14 | Stop reason: HOSPADM

## 2024-05-14 RX ORDER — NALOXONE HCL 0.4 MG/ML
0.02 VIAL (ML) INJECTION
Status: DISCONTINUED | OUTPATIENT
Start: 2024-05-14 | End: 2024-05-16 | Stop reason: HOSPADM

## 2024-05-14 RX ORDER — ONDANSETRON HYDROCHLORIDE 2 MG/ML
INJECTION, SOLUTION INTRAVENOUS
Status: DISCONTINUED | OUTPATIENT
Start: 2024-05-14 | End: 2024-05-14

## 2024-05-14 RX ORDER — PANTOPRAZOLE SODIUM 40 MG/1
40 TABLET, DELAYED RELEASE ORAL DAILY
Status: DISCONTINUED | OUTPATIENT
Start: 2024-05-15 | End: 2024-05-16 | Stop reason: HOSPADM

## 2024-05-14 RX ORDER — SODIUM CHLORIDE 0.9 % (FLUSH) 0.9 %
10 SYRINGE (ML) INJECTION
Status: DISCONTINUED | OUTPATIENT
Start: 2024-05-14 | End: 2024-05-14 | Stop reason: HOSPADM

## 2024-05-14 RX ORDER — ACETAMINOPHEN 500 MG
1000 TABLET ORAL
Status: DISCONTINUED | OUTPATIENT
Start: 2024-05-14 | End: 2024-05-14

## 2024-05-14 RX ADMIN — ROCURONIUM BROMIDE 20 MG: 10 INJECTION, SOLUTION INTRAVENOUS at 01:05

## 2024-05-14 RX ADMIN — DEXMEDETOMIDINE 12 MCG: 200 INJECTION, SOLUTION INTRAVENOUS at 05:05

## 2024-05-14 RX ADMIN — Medication 100 MCG: at 01:05

## 2024-05-14 RX ADMIN — DEXAMETHASONE SODIUM PHOSPHATE 8 MG: 4 INJECTION INTRA-ARTICULAR; INTRALESIONAL; INTRAMUSCULAR; INTRAVENOUS; SOFT TISSUE at 12:05

## 2024-05-14 RX ADMIN — CEFAZOLIN 2 G: 2 INJECTION, POWDER, FOR SOLUTION INTRAMUSCULAR; INTRAVENOUS at 04:05

## 2024-05-14 RX ADMIN — CELECOXIB 400 MG: 200 CAPSULE ORAL at 10:05

## 2024-05-14 RX ADMIN — FENTANYL CITRATE 50 MCG: 50 INJECTION INTRAMUSCULAR; INTRAVENOUS at 02:05

## 2024-05-14 RX ADMIN — FENTANYL CITRATE 100 MCG: 50 INJECTION INTRAMUSCULAR; INTRAVENOUS at 12:05

## 2024-05-14 RX ADMIN — Medication 100 MCG: at 12:05

## 2024-05-14 RX ADMIN — SODIUM CHLORIDE: 9 INJECTION, SOLUTION INTRAVENOUS at 05:05

## 2024-05-14 RX ADMIN — CEFAZOLIN 2 G: 2 INJECTION, POWDER, FOR SOLUTION INTRAMUSCULAR; INTRAVENOUS at 12:05

## 2024-05-14 RX ADMIN — ROCURONIUM BROMIDE 20 MG: 10 INJECTION, SOLUTION INTRAVENOUS at 02:05

## 2024-05-14 RX ADMIN — ROCURONIUM BROMIDE 50 MG: 10 INJECTION, SOLUTION INTRAVENOUS at 12:05

## 2024-05-14 RX ADMIN — ROCURONIUM BROMIDE 20 MG: 10 INJECTION, SOLUTION INTRAVENOUS at 03:05

## 2024-05-14 RX ADMIN — ROCURONIUM BROMIDE 10 MG: 10 INJECTION, SOLUTION INTRAVENOUS at 01:05

## 2024-05-14 RX ADMIN — LIDOCAINE HYDROCHLORIDE 50 MG: 20 INJECTION, SOLUTION EPIDURAL; INFILTRATION; INTRACAUDAL at 12:05

## 2024-05-14 RX ADMIN — HEPARIN SODIUM 5000 UNITS: 5000 INJECTION INTRAVENOUS; SUBCUTANEOUS at 10:05

## 2024-05-14 RX ADMIN — SUGAMMADEX 200 MG: 100 INJECTION, SOLUTION INTRAVENOUS at 05:05

## 2024-05-14 RX ADMIN — SODIUM CHLORIDE, SODIUM GLUCONATE, SODIUM ACETATE, POTASSIUM CHLORIDE, MAGNESIUM CHLORIDE, SODIUM PHOSPHATE, DIBASIC, AND POTASSIUM PHOSPHATE: .53; .5; .37; .037; .03; .012; .00082 INJECTION, SOLUTION INTRAVENOUS at 12:05

## 2024-05-14 RX ADMIN — METHOCARBAMOL 1000 MG: 100 INJECTION, SOLUTION INTRAMUSCULAR; INTRAVENOUS at 09:05

## 2024-05-14 RX ADMIN — MIDAZOLAM 2 MG: 1 INJECTION INTRAMUSCULAR; INTRAVENOUS at 12:05

## 2024-05-14 RX ADMIN — INDOCYANINE GREEN AND WATER 2.5 MG: KIT at 01:05

## 2024-05-14 RX ADMIN — METRONIDAZOLE 500 MG: 500 INJECTION, SOLUTION INTRAVENOUS at 01:05

## 2024-05-14 RX ADMIN — ROCURONIUM BROMIDE 10 MG: 10 INJECTION, SOLUTION INTRAVENOUS at 02:05

## 2024-05-14 RX ADMIN — PIPERACILLIN SODIUM AND TAZOBACTAM SODIUM 4.5 G: 4; .5 INJECTION, POWDER, FOR SOLUTION INTRAVENOUS at 05:05

## 2024-05-14 RX ADMIN — MUPIROCIN 1 G: 20 OINTMENT TOPICAL at 09:05

## 2024-05-14 RX ADMIN — ROCURONIUM BROMIDE 10 MG: 10 INJECTION, SOLUTION INTRAVENOUS at 04:05

## 2024-05-14 RX ADMIN — PROPOFOL 150 MG: 10 INJECTION, EMULSION INTRAVENOUS at 12:05

## 2024-05-14 RX ADMIN — ACETAMINOPHEN 1000 MG: 10 INJECTION, SOLUTION INTRAVENOUS at 09:05

## 2024-05-14 RX ADMIN — ACETAMINOPHEN 1000 MG: 500 TABLET ORAL at 10:05

## 2024-05-14 RX ADMIN — SODIUM CHLORIDE: 9 INJECTION, SOLUTION INTRAVENOUS at 12:05

## 2024-05-14 RX ADMIN — ONDANSETRON 8 MG: 8 TABLET, ORALLY DISINTEGRATING ORAL at 09:05

## 2024-05-14 RX ADMIN — Medication: at 06:05

## 2024-05-14 RX ADMIN — ONDANSETRON 4 MG: 2 INJECTION INTRAMUSCULAR; INTRAVENOUS at 04:05

## 2024-05-14 RX ADMIN — Medication 100 MCG: at 04:05

## 2024-05-14 NOTE — BRIEF OP NOTE
[facility] ([facility])  Brief Operative Note    SUMMARY     Surgery Date: 5/14/2024     Surgeons and Role:     * Avtar De Leon MD - Primary     * Luciano Paulino MD        Pre-op Diagnosis:  Thickening of wall of gallbladder with pericholecystic fluid [K82.8]    Post-op Diagnosis:  Post-Op Diagnosis Codes:     * Thickening of wall of gallbladder with pericholecystic fluid [K82.8]    Procedure(s) (LRB):  XI ROBOTIC CHOLECYSTECTOMY, subtotal cholecystectomy (N/A)    Anesthesia: General    Implants:  * No implants in log *    Operative Findings: robotic converted to open subtotal cholecystectomy. Dense adhesions, gallbladder adhered to colon and duodenum. Negative frozen sections of lymph nodes, omentum, and gallbladder fundus. 4 large gallstones extracted, hydrops encountered with no bile in gallbladder. 19Fr jeremy drain left in place.     Estimated Blood Loss: 100 mL    Estimated Blood Loss has been documented.         Specimens:   Specimen (24h ago, onward)       Start     Ordered    05/14/24 1656  Specimen to Pathology, Surgery General Surgery  Once        Comments: Pre-op Diagnosis: Thickening of wall of gallbladder with pericholecystic fluid [K82.8]Procedure(s):XI ROBOTIC CHOLECYSTECTOMY, possible radical cholecystectomy Number of specimens: 6Name of specimens: 1. Common hepatic artery lymph nodes - Frozen2. Omentum - Frozen3. Intra abdominal lymph node - Frozen4. additional node- permanent5. gallbladder wall-frozen6. subtotal cholecystectomy- permanent     References:    Click here for ordering Quick Tip   Question Answer Comment   Procedure Type: General Surgery    Specimen Class: Complex case/Special    Release to patient Immediate        05/14/24 1656    Pending  Specimen to Pathology, Surgery General Surgery  Once        Comments: Pre-op Diagnosis: Thickening of wall of gallbladder with pericholecystic fluid [K82.8]Procedure(s):XI ROBOTIC CHOLECYSTECTOMY, possible radical cholecystectomy Number of  specimens: 1Name of specimens: 1. Gallbladder - Permanent     References:    Click here for ordering Quick Tip   Question Answer Comment   Procedure Type: General Surgery    Specimen Class: Routine/Screening        Pending                    [id number]

## 2024-05-14 NOTE — ANESTHESIA PROCEDURE NOTES
Intubation    Date/Time: 5/14/2024 12:30 PM    Performed by: Lee Huizar DO  Authorized by: Chriss Reyna Jr., MD    Intubation:     Induction:  Intravenous    Intubated:  Postinduction    Mask Ventilation:  Easy mask    Attempts:  2    Attempted By:  Resident anesthesiologist    Method of Intubation:  Direct    Blade:  Delgado 2    Laryngeal View Grade: Grade IIb - only the arytenoids and epiglottis seen      Attempted By (2nd Attempt):  Resident anesthesiologist    Method of Intubation (2nd Attempt):  Video laryngoscopy    Blade (2nd Attempt):  Diaz 3    Laryngeal View Grade (2nd Attempt): Grade I - full view of cords      Difficult Airway Encountered?: No      Complications:  None    Airway Device:  Oral endotracheal tube    Airway Device Size:  7.5    Style/Cuff Inflation:  Cuffed (inflated to minimal occlusive pressure)    Tube secured:  21    Secured at:  The lips    Placement Verified By:  Capnometry    Complicating Factors:  None    Findings Post-Intubation:  BS equal bilateral and atraumatic/condition of teeth unchanged

## 2024-05-14 NOTE — PROGRESS NOTES
The patient location is: Home  The chief complaint leading to consultation is:Anxiety  Visit type: Virtual visit with synchronous audio and video  Total time spent with patient: 15 minutes  Each patient to whom he or she provides medical services by telemedicine is:  (1) informed of the relationship between the physician and patient and the respective role of any other health care provider with respect to management of the patient; and (2) notified that he or she may decline to receive medical services by telemedicine and may withdraw from such care at any time.    Notes:   Diagnoses and all orders for this visit:    Anxiety    Other orders  -     ALPRAZolam (XANAX) 0.5 MG tablet; Take 1 tablet (0.5 mg total) by mouth 3 (three) times daily as needed for Anxiety.      Reviewed timing of use in the perioperative period, only post op and with no residual anesthetic impairment.  Kya Avitia presents with moderate/severe anxiety anticipating lap david today. Reviewed appropriateness of Anesthesia determining immediate perioperative tx. Depression controlled  See below MSE  Past Medical History:   Diagnosis Date    Allergy     Chronic back pain 11/15/2018    Chronic neck pain     Chronically on opiate therapy     Depression     Kidney stone      Past Surgical History:   Procedure Laterality Date    LEEP       Review of patient's allergies indicates:   Allergen Reactions    Latex, natural rubber Itching     Pt reports itching of hands after wearing latex gloves.     Current Outpatient Medications on File Prior to Visit   Medication Sig Dispense Refill    cyclobenzaprine (FLEXERIL) 10 MG tablet Uses prn (Patient not taking: Reported on 5/13/2024)      dicyclomine (BENTYL) 20 mg tablet Take 20 mg by mouth every 6 (six) hours. As needed. (Patient not taking: Reported on 5/1/2024)      estradiol (ESTRACE) 1 MG tablet Take 1 mg by mouth once daily.  6    FLUoxetine 20 MG capsule Take 3 capsules (60 mg total) by mouth once  daily. 270 capsule 3    medroxyPROGESTERone (PROVERA) 2.5 MG tablet Take 2.5 mg by mouth once daily.      polyethylene glycol (GLYCOLAX) 17 gram/dose powder Take 17 g by mouth once daily. Mix in water or juice 510 g prn    zolpidem (AMBIEN) 5 MG Tab Take 1 tablet (5 mg total) by mouth nightly as needed (insomnia). (Patient not taking: Reported on 3/21/2024) 30 tablet 5     No current facility-administered medications on file prior to visit.     Social History     Socioeconomic History    Marital status:    Tobacco Use    Smoking status: Never     Passive exposure: Never    Smokeless tobacco: Never   Substance and Sexual Activity    Alcohol use: Yes     Alcohol/week: 5.8 standard drinks of alcohol     Types: 7 Standard drinks or equivalent per week    Drug use: No     Social Determinants of Health     Financial Resource Strain: Medium Risk (5/14/2024)    Overall Financial Resource Strain (CARDIA)     Difficulty of Paying Living Expenses: Somewhat hard   Food Insecurity: Patient Declined (5/14/2024)    Hunger Vital Sign     Worried About Running Out of Food in the Last Year: Patient declined     Ran Out of Food in the Last Year: Patient declined   Transportation Needs: No Transportation Needs (5/14/2024)    PRAPARE - Transportation     Lack of Transportation (Medical): No     Lack of Transportation (Non-Medical): No   Physical Activity: Insufficiently Active (5/14/2024)    Exercise Vital Sign     Days of Exercise per Week: 2 days     Minutes of Exercise per Session: 30 min   Stress: Stress Concern Present (5/14/2024)    [employer] - Occupational Stress Questionnaire     Feeling of Stress : Very much   Housing Stability: Unknown (5/14/2024)    Housing Stability Vital Sign     Unable to Pay for Housing in the Last Year: No     Family History   Problem Relation Name Age of Onset    Breast cancer Maternal Grandmother           ROS:  SKIN: No rashes, itching or changes in color or  texture of skin.  EYES: Visual acuity fine. No photophobia, ocular pain or diplopia.EARS: Denies ear pain, discharge or vertigo.NOSE: No loss of smell, no epistaxis some postnasal drip.MOUTH & THROAT: No hoarseness or change in voice. No excessive gum bleeding.CHEST: Denies GLEASON, cyanosis, wheezing  CARDIOVASCULAR: Denies chest pain, PND, orthopnea or reduced exercise tolerance.  ABDOMEN:  Hx abdominal pain, no hematemesis or blood in stool.  URINARY: No flank pain, dysuria or hematuria.    PE: Heent:Normocephalic with no recent cranial trauma,conjunctiva clear  Chest:No tachypnea. No wheezing, rhonchi on forced expiration  Abdomen:Soft, non tender to patient palpation  MSE: Patient is alert and oriented x4, no plosive speech, agitation,tangential thoughts. No SI.     Answers submitted by the patient for this visit:  Review of Systems Questionnaire (Submitted on 5/14/2024)  activity change: Yes  unexpected weight change: No  neck pain: No  hearing loss: No  rhinorrhea: No  trouble swallowing: No  eye discharge: No  visual disturbance: No  chest tightness: Yes  wheezing: No  chest pain: Yes  palpitations: Yes  blood in stool: No  constipation: No  vomiting: No  diarrhea: No  polydipsia: No  polyuria: No  difficulty urinating: No  hematuria: No  menstrual problem: No  dysuria: No  joint swelling: No  arthralgias: No  headaches: Yes  weakness: No  confusion: No  dysphoric mood: No

## 2024-05-15 PROBLEM — E87.1 HYPONATREMIA: Status: ACTIVE | Noted: 2024-05-15

## 2024-05-15 LAB
ALBUMIN SERPL BCP-MCNC: 3.3 G/DL (ref 3.5–5.2)
ALP SERPL-CCNC: 83 U/L (ref 55–135)
ALT SERPL W/O P-5'-P-CCNC: 86 U/L (ref 10–44)
ANION GAP SERPL CALC-SCNC: 10 MMOL/L (ref 8–16)
AST SERPL-CCNC: 174 U/L (ref 10–40)
BASOPHILS # BLD AUTO: 0.03 K/UL (ref 0–0.2)
BASOPHILS NFR BLD: 0.2 % (ref 0–1.9)
BILIRUB SERPL-MCNC: 0.5 MG/DL (ref 0.1–1)
BUN SERPL-MCNC: 9 MG/DL (ref 6–20)
CALCIUM SERPL-MCNC: 8.3 MG/DL (ref 8.7–10.5)
CHLORIDE SERPL-SCNC: 102 MMOL/L (ref 95–110)
CO2 SERPL-SCNC: 20 MMOL/L (ref 23–29)
CREAT SERPL-MCNC: 0.8 MG/DL (ref 0.5–1.4)
DIFFERENTIAL METHOD BLD: ABNORMAL
EOSINOPHIL # BLD AUTO: 0 K/UL (ref 0–0.5)
EOSINOPHIL NFR BLD: 0 % (ref 0–8)
ERYTHROCYTE [DISTWIDTH] IN BLOOD BY AUTOMATED COUNT: 15.2 % (ref 11.5–14.5)
EST. GFR  (NO RACE VARIABLE): >60 ML/MIN/1.73 M^2
GLUCOSE SERPL-MCNC: 117 MG/DL (ref 70–110)
HCT VFR BLD AUTO: 36.5 % (ref 37–48.5)
HGB BLD-MCNC: 11.8 G/DL (ref 12–16)
IMM GRANULOCYTES # BLD AUTO: 0.07 K/UL (ref 0–0.04)
IMM GRANULOCYTES NFR BLD AUTO: 0.5 % (ref 0–0.5)
LYMPHOCYTES # BLD AUTO: 0.9 K/UL (ref 1–4.8)
LYMPHOCYTES NFR BLD: 6.1 % (ref 18–48)
MAGNESIUM SERPL-MCNC: 1.8 MG/DL (ref 1.6–2.6)
MCH RBC QN AUTO: 28.6 PG (ref 27–31)
MCHC RBC AUTO-ENTMCNC: 32.3 G/DL (ref 32–36)
MCV RBC AUTO: 89 FL (ref 82–98)
MONOCYTES # BLD AUTO: 1 K/UL (ref 0.3–1)
MONOCYTES NFR BLD: 6.8 % (ref 4–15)
NEUTROPHILS # BLD AUTO: 12.4 K/UL (ref 1.8–7.7)
NEUTROPHILS NFR BLD: 86.4 % (ref 38–73)
NRBC BLD-RTO: 0 /100 WBC
PHOSPHATE SERPL-MCNC: 3.1 MG/DL (ref 2.7–4.5)
PLATELET # BLD AUTO: 368 K/UL (ref 150–450)
PMV BLD AUTO: 9.2 FL (ref 9.2–12.9)
POTASSIUM SERPL-SCNC: 3.8 MMOL/L (ref 3.5–5.1)
PROT SERPL-MCNC: 6.6 G/DL (ref 6–8.4)
RBC # BLD AUTO: 4.12 M/UL (ref 4–5.4)
SODIUM SERPL-SCNC: 132 MMOL/L (ref 136–145)
WBC # BLD AUTO: 14.38 K/UL (ref 3.9–12.7)

## 2024-05-15 PROCEDURE — 25000003 PHARM REV CODE 250: Performed by: STUDENT IN AN ORGANIZED HEALTH CARE EDUCATION/TRAINING PROGRAM

## 2024-05-15 PROCEDURE — 97162 PT EVAL MOD COMPLEX 30 MIN: CPT

## 2024-05-15 PROCEDURE — 97165 OT EVAL LOW COMPLEX 30 MIN: CPT

## 2024-05-15 PROCEDURE — 97535 SELF CARE MNGMENT TRAINING: CPT

## 2024-05-15 PROCEDURE — 63600175 PHARM REV CODE 636 W HCPCS: Performed by: STUDENT IN AN ORGANIZED HEALTH CARE EDUCATION/TRAINING PROGRAM

## 2024-05-15 PROCEDURE — 36415 COLL VENOUS BLD VENIPUNCTURE: CPT | Performed by: STUDENT IN AN ORGANIZED HEALTH CARE EDUCATION/TRAINING PROGRAM

## 2024-05-15 PROCEDURE — 84100 ASSAY OF PHOSPHORUS: CPT | Performed by: STUDENT IN AN ORGANIZED HEALTH CARE EDUCATION/TRAINING PROGRAM

## 2024-05-15 PROCEDURE — 80053 COMPREHEN METABOLIC PANEL: CPT | Performed by: STUDENT IN AN ORGANIZED HEALTH CARE EDUCATION/TRAINING PROGRAM

## 2024-05-15 PROCEDURE — 25000003 PHARM REV CODE 250

## 2024-05-15 PROCEDURE — 85025 COMPLETE CBC W/AUTO DIFF WBC: CPT | Performed by: STUDENT IN AN ORGANIZED HEALTH CARE EDUCATION/TRAINING PROGRAM

## 2024-05-15 PROCEDURE — 20600001 HC STEP DOWN PRIVATE ROOM

## 2024-05-15 PROCEDURE — 83735 ASSAY OF MAGNESIUM: CPT | Performed by: STUDENT IN AN ORGANIZED HEALTH CARE EDUCATION/TRAINING PROGRAM

## 2024-05-15 PROCEDURE — 97530 THERAPEUTIC ACTIVITIES: CPT

## 2024-05-15 PROCEDURE — 97116 GAIT TRAINING THERAPY: CPT

## 2024-05-15 RX ORDER — METHOCARBAMOL 500 MG/1
500 TABLET, FILM COATED ORAL 4 TIMES DAILY
Status: DISCONTINUED | OUTPATIENT
Start: 2024-05-15 | End: 2024-05-16 | Stop reason: HOSPADM

## 2024-05-15 RX ORDER — GABAPENTIN 300 MG/1
300 CAPSULE ORAL 3 TIMES DAILY
Status: DISCONTINUED | OUTPATIENT
Start: 2024-05-15 | End: 2024-05-16 | Stop reason: HOSPADM

## 2024-05-15 RX ORDER — OXYCODONE HYDROCHLORIDE 10 MG/1
10 TABLET ORAL EVERY 4 HOURS PRN
Status: DISCONTINUED | OUTPATIENT
Start: 2024-05-15 | End: 2024-05-16 | Stop reason: HOSPADM

## 2024-05-15 RX ORDER — POLYETHYLENE GLYCOL 3350 17 G/17G
17 POWDER, FOR SOLUTION ORAL 2 TIMES DAILY
Status: DISCONTINUED | OUTPATIENT
Start: 2024-05-15 | End: 2024-05-16 | Stop reason: HOSPADM

## 2024-05-15 RX ORDER — OXYCODONE HYDROCHLORIDE 5 MG/1
5 TABLET ORAL EVERY 4 HOURS PRN
Status: DISCONTINUED | OUTPATIENT
Start: 2024-05-15 | End: 2024-05-16 | Stop reason: HOSPADM

## 2024-05-15 RX ADMIN — PIPERACILLIN SODIUM AND TAZOBACTAM SODIUM 4.5 G: 4; .5 INJECTION, POWDER, FOR SOLUTION INTRAVENOUS at 01:05

## 2024-05-15 RX ADMIN — OXYCODONE HYDROCHLORIDE 10 MG: 10 TABLET ORAL at 11:05

## 2024-05-15 RX ADMIN — OXYCODONE HYDROCHLORIDE 10 MG: 10 TABLET ORAL at 03:05

## 2024-05-15 RX ADMIN — PANTOPRAZOLE SODIUM 40 MG: 40 TABLET, DELAYED RELEASE ORAL at 08:05

## 2024-05-15 RX ADMIN — GABAPENTIN 300 MG: 300 CAPSULE ORAL at 03:05

## 2024-05-15 RX ADMIN — ENOXAPARIN SODIUM 40 MG: 40 INJECTION SUBCUTANEOUS at 05:05

## 2024-05-15 RX ADMIN — PIPERACILLIN SODIUM AND TAZOBACTAM SODIUM 4.5 G: 4; .5 INJECTION, POWDER, FOR SOLUTION INTRAVENOUS at 05:05

## 2024-05-15 RX ADMIN — POLYETHYLENE GLYCOL 3350 17 G: 17 POWDER, FOR SOLUTION ORAL at 08:05

## 2024-05-15 RX ADMIN — GABAPENTIN 300 MG: 300 CAPSULE ORAL at 08:05

## 2024-05-15 RX ADMIN — GABAPENTIN 300 MG: 300 CAPSULE ORAL at 09:05

## 2024-05-15 RX ADMIN — ACETAMINOPHEN 1000 MG: 10 INJECTION, SOLUTION INTRAVENOUS at 01:05

## 2024-05-15 RX ADMIN — METHOCARBAMOL 500 MG: 500 TABLET ORAL at 05:05

## 2024-05-15 RX ADMIN — ACETAMINOPHEN 1000 MG: 10 INJECTION, SOLUTION INTRAVENOUS at 05:05

## 2024-05-15 RX ADMIN — METHOCARBAMOL 500 MG: 500 TABLET ORAL at 09:05

## 2024-05-15 RX ADMIN — MUPIROCIN 1 G: 20 OINTMENT TOPICAL at 08:05

## 2024-05-15 RX ADMIN — PIPERACILLIN SODIUM AND TAZOBACTAM SODIUM 4.5 G: 4; .5 INJECTION, POWDER, FOR SOLUTION INTRAVENOUS at 09:05

## 2024-05-15 RX ADMIN — METHOCARBAMOL 500 MG: 500 TABLET ORAL at 01:05

## 2024-05-15 RX ADMIN — METHOCARBAMOL 1000 MG: 100 INJECTION, SOLUTION INTRAMUSCULAR; INTRAVENOUS at 05:05

## 2024-05-15 RX ADMIN — OXYCODONE HYDROCHLORIDE 10 MG: 10 TABLET ORAL at 10:05

## 2024-05-15 RX ADMIN — METHOCARBAMOL 500 MG: 500 TABLET ORAL at 08:05

## 2024-05-15 NOTE — PT/OT/SLP EVAL
Physical Therapy Evaluation and Treatment    Patient Name: Mary Avitia   MRN: [id number]  Recent Surgery: Procedure(s) (LRB):  XI ROBOTIC CHOLECYSTECTOMY, subtotal cholecystectomy (N/A) 1 Day Post-Op    Recommendations:     Discharge Recommendations: No Therapy Indicated   Discharge Equipment Recommendations: none   Barriers to discharge: None    Assessment:     Mary Avitia is a 54 y.o. female admitted with a medical diagnosis of Thickening of wall of gallbladder with pericholecystic fluid. She presents with the following impairments/functional limitations: impaired endurance, impaired self care skills, impaired functional mobility, gait instability, pain. Pt presents s/p robotic david with expected post op deficits due to deconditioning and pain/weakness at her core due to sx.  She did well today at [facility] and was able to amb in the hallway with CGA.  She amb with slow, cautious gait pattern but had no LOB and only mild dizziness initially with standing.  She is motivated to participate well with PT and should progress well with PT.     Rehab Prognosis: Good; patient would benefit from acute PT services to address these deficits and reach maximum level of function.    Plan:     During this hospitalization, patient to be seen 3 x/week to address the above listed problems via gait training, therapeutic activities, therapeutic exercises, neuromuscular re-education  Pt should only need 2-3 more visits to reach goals.     Plan of Care Expires: 06/14/24    Subjective     Chief Complaint: Pt reports feeling tired  Patient Comments/Goals: to go home  Pain/Comfort:  Pain Rating 1: 7/10  Location - Orientation 1: generalized  Location 1: abdomen  Pain Addressed 1: Pre-medicate for activity, Reposition, Distraction  Pain Rating Post-Intervention 1: 7/10    Social History:  Living Environment: Patient lives with their spouse in a single story home with number of outside stair(s): entry step only  Prior Level of Function:  Prior to admission, patient was independent  Equipment Used at Home: none  DME owned (not currently used): none  Assistance Upon Discharge:  her  is home during the day and can assist her if needed    Objective:     Communicated with RNBernice,  prior to session. Patient found HOB elevated with PCA, peripheral IV, JOSÉ ANTONIO drain, solitario catheter (CO2 monitor) upon PT entry to room.    General Precautions: Standard, fall   Orthopedic Precautions: N/A   Braces: N/A    Respiratory Status: Room air    Exams:  Cognition: Patient is oriented to Person, Place, Time, Situation  RLE ROM: WNL  RLE Strength: WNL  LLE ROM: WNL  LLE Strength: WNL  Gross Motor Coordination: WFL  Postural Exam: Patient presented with the following abnormalities:    -       Rounded shoulders  -       Forward head  Sensation:    -       Intact    Functional Mobility:  Gait belt applied - Yes  Bed Mobility  Rolling Right: supervision  Supine to Sit: stand by assistance for log rolling technique  Transfers  Sit to Stand: stand by assistance with no AD and with cues for hand placement  Gait  Patient ambulated 130' with  pt holding to IV pole  and contact guard assistance. Patient demonstrates occasional unsteady gait, decreased step length, and decreased arm swing. Pt with no LOB and no dizziness during gait. Only mild dizziness with initial standing.  All lines remained intact throughout ambulation trail.  Balance  Sitting: supervision  Standing: contact guard assistance      Therapeutic Activities and Exercises:   Patient educated on role of acute care PT and PT POC, safety while in hospital including calling nurse for mobility, call light usage, and log rolling technique, body mechanics and posture and applications to mobility throughout her eval and using pillow for splinting against her abdomen for pain control.   She demonstrated good understanding back to PT.   Patient educated about importance of OOB mobility and remaining up in chair most of  the day.  Pt ed on amb with nsg/ PCT 2-3 x day in addition to PT  and  she verbalized good understanding back to PT.   White board updated in pt's room with therapist and current mobility/transfer level and assist required  PT updated pt's nurse, Bernice, on pt's mobility level and requested to have nsg assist pt with ambulation 2 more times today in the hallway.    AM-PAC 6 CLICK MOBILITY  Total Score:18    Patient left up in chair with all lines intact, call button in reach, RN notified, and PCT present.    GOALS:   Multidisciplinary Problems       Physical Therapy Goals          Problem: Physical Therapy    Goal Priority Disciplines Outcome Goal Variances Interventions   Physical Therapy Goal     PT, PT/OT Progressing     Description: Goals to be met by: 24    Patient will increase functional independence with mobility by performin. Supine to sit with Modified [place] via log roll technique  2. Sit to stand transfer with Supervision  3. Gait  x 300 feet with Supervision using No Assistive Device.   5. Lower extremity exercise program x30 reps per handout, with supervision                         History:     Past Medical History:   Diagnosis Date    Allergy     Chronic back pain 11/15/2018    Chronic neck pain     Chronically on opiate therapy     Depression     Kidney stone        Past Surgical History:   Procedure Laterality Date    LEEP      ROBOT-ASSISTED CHOLECYSTECTOMY USING DA VELIA XI N/A 2024    Procedure: XI ROBOTIC CHOLECYSTECTOMY, subtotal cholecystectomy;  Surgeon: Avtar De Leon MD;  Location: [facility] OR [address] [place] [address];  Service: General;  Laterality: N/A;  converted to open at approx. 1430 subtotal cholecystectomy       Time Tracking:     PT Received On: 05/15/24  PT Start Time: 857  PT Stop Time: 928  PT Total Time (min): 31 min     Billable Minutes: Evaluation 8, Gait Training 9, and Therapeutic Activity 14    5/15/2024

## 2024-05-15 NOTE — CARE UPDATE
I have reviewed the chart of Mary Avitia who is hospitalized for the following:    Active Hospital Problems    Diagnosis    *Thickening of wall of gallbladder with pericholecystic fluid    Hyponatremia     POA Na 133  - trend labs          Victorino Morales PA-C  Unit [facility]

## 2024-05-15 NOTE — PT/OT/SLP EVAL
Occupational Therapy   Evaluation and Discharge Note    Name: Mary Avitia  MRN: [id number]  Admitting Diagnosis: Thickening of wall of gallbladder with pericholecystic fluid  Recent Surgery: Procedure(s) (LRB):  XI ROBOTIC CHOLECYSTECTOMY, subtotal cholecystectomy (N/A) 1 Day Post-Op    Recommendations:     Discharge Recommendations: No Therapy Indicated  Discharge Equipment Recommendations: none  Barriers to discharge:  None    Assessment:     Mary Avitia is a 54 y.o. female with a medical diagnosis of Thickening of wall of gallbladder with pericholecystic fluid. At this time, patient is functioning at their prior level of function and does not require further acute OT services.     Plan:     During this hospitalization, patient does not require further acute OT services.  Please re-consult if situation changes.    Plan of Care Reviewed with: patient    Subjective     Chief Complaint: Abdomen  Patient/Family Comments/goals: return home    Occupational Profile:  Living Environment: Lives with  in [facility] c/ 1 VEE. Bathroom has tub/shower  Previous level of function: Indp  Roles and Routines: Wife  Equipment Used at home: none  Assistance upon Discharge:     Pain/Comfort:  Pain Rating 1: 5/10  Location - Orientation 1: generalized  Location 1: abdomen  Pain Addressed 1: Pre-medicate for activity, Reposition, Distraction    Patients cultural, spiritual, Sikhism conflicts given the current situation: no    Objective:     Communicated with: Nsg prior to session.  Patient found up in chair with PCA, peripheral IV, JOSÉ ANTONIO drain, solitario catheter upon OT entry to room.    General Precautions: Standard, fall  Orthopedic Precautions: N/A  Braces: N/A  Respiratory Status: Room air     Occupational Performance:    Bed Mobility:    Pt up in chair    Functional Mobility/Transfers:  Patient completed Sit <> Stand Transfer with supervision and stand by assistance  with  no assistive device   Patient completed Toilet  Transfer Step Transfer technique with modified independence with  grab bars  Chair t/f c/ S  Functional Mobility: Pt ambulating room level c/ SBA- S    Activities of Daily Living:  Grooming: supervision combing hair, facial and oral Hygiene   Toileting: modified independence      Cognitive/Visual Perceptual:  A&O x4    Physical Exam:  BUE WNL    AMPAC 6 Click ADL:  AMPA Total Score: 24    Treatment & Education:  Pt educated on role and purpose of therapy  Pt educated on goal setting  Pt educated on benefits of OOB activity  Pt educated on self advocacy     Patient left up in chair with all lines intact, call button in reach, and nsg notified    GOALS:   Multidisciplinary Problems       Occupational Therapy Goals       Not on file              Multidisciplinary Problems (Resolved)          Problem: Occupational Therapy    Goal Priority Disciplines Outcome Interventions   Occupational Therapy Goal   (Resolved)     OT, PT/OT Met                        History:     Past Medical History:   Diagnosis Date    Allergy     Chronic back pain 11/15/2018    Chronic neck pain     Chronically on opiate therapy     Depression     Kidney stone          Past Surgical History:   Procedure Laterality Date    LEEP      ROBOT-ASSISTED CHOLECYSTECTOMY USING DA VELIA XI N/A 5/14/2024    Procedure: XI ROBOTIC CHOLECYSTECTOMY, subtotal cholecystectomy;  Surgeon: Avtar De Leon MD;  Location: [facility] OR [address] [place] [address];  Service: General;  Laterality: N/A;  converted to open at approx. 1430 subtotal cholecystectomy       Time Tracking:     OT Date of Treatment: 05/15/24  OT Start Time: 1025  OT Stop Time: 1047  OT Total Time (min): 22 min    Billable Minutes:Evaluation 8  Self Care/Home Management 14    5/15/2024

## 2024-05-15 NOTE — RESPIRATORY THERAPY
"RAPID RESPONSE RESPIRATORY THERAPY ETCO2 CHECK         Time of visit: 809     Code Status: No Order   : 1970  Bed: 1028/1028 A:   MRN: [id number]  Time spent at the bedside: < 15 min    SITUATION    Evaluated patient for: ETCo2 compliance    BACKGROUND    Why is the patient in the hospital?: Thickening of wall of gallbladder with pericholecystic fluid    Patient has a past medical history of Allergy, Chronic back pain, Chronic neck pain, Chronically on opiate therapy, Depression, and Kidney stone.    24 Hours Vitals Range:  Temp:  [97.2 °F (36.2 °C)-99.1 °F (37.3 °C)]   Pulse:  [66-88]   Resp:  [13-23]   BP: (114-143)/(63-83)   SpO2:  [96 %-100 %]     Labs:    Recent Labs     05/14/24  2145 05/15/24  0313   * 132*   K 3.9 3.8    102   CO2 20* 20*   BUN 9 9   CREATININE 0.8 0.8   * 117*   PHOS 2.6* 3.1   MG 1.6 1.8        No results for input(s): "PH", "PCO2", "PO2", "HCO3", "POCSATURATED", "BE" in the last 72 hours.    ASSESSMENT/INTERVENTIONS      Last VS   Temp: 98 °F (36.7 °C) (05/15 07)  Pulse: 73 (05/15 0704)  Resp: 16 (05/15 0930)  BP: 114/63 (05/15 0704)  SpO2: 96 % (05/15 0704)    Level of Consciousness: Level of Consciousness (AVPU): alert  Respiratory Effort: Respiratory Effort: Normal Expansion/Accessory Muscle Usage:    All Lung Field Breath Sounds:    Is the ETCO2 monitor on? Yes  Is the patient wearing a cannula? Yes  Are ETCO2 orders placed? Yes  Is the patient on a PCA pump? Yes  ETCO2 monitored: ETCO2 (mmHg): 35 mmHg  Ambu at bedside:      Active Orders   Respiratory Care    ACAPELLA TREATMENT Q6H WAKE     Frequency: Q6H WAKE     Number of Occurrences: Until Specified     Order Comments: Q10x Q1 hour w/a      END TIDAL CO2 MONITOR Q12H     Frequency: Q12H     Number of Occurrences: Until Specified    Incentive spirometry     Frequency: Q6H WAKE     Number of Occurrences: Until Specified     Order Comments: Q10x Q1 hour w/a         RECOMMENDATIONS    We recommend: RRT Recs: " Continue POC per primary team.      FOLLOW-UP    Please call back the Rapid Response RT, Fiorella Israel, HOLLAND at x [phone] for any questions or concerns.

## 2024-05-15 NOTE — PLAN OF CARE
Problem: Physical Therapy  Goal: Physical Therapy Goal  Description: Goals to be met by: 24    Patient will increase functional independence with mobility by performin. Supine to sit with Modified [place] via log roll technique  2. Sit to stand transfer with Supervision  3. Gait  x 300 feet with Supervision using No Assistive Device.   5. Lower extremity exercise program x30 reps per handout, with supervision    Outcome: Progressing

## 2024-05-15 NOTE — ASSESSMENT & PLAN NOTE
54 y.o. female with hx of gallbladder wall thickening, chronic cholecystitis vs cancer. She is now 1 Day Post-Op robotic converted to open subtotal cholecystectomy. Multiple intra-op frozens negative for malignancy.     - Continue CLD. Will evaluate around lunch time to possibly advance diet  - Discontinue PCA  - MM pain control  - Discontinue IVF  - Discontinue solitario  - Lovenox  - Daily labs  - PT/OT

## 2024-05-15 NOTE — NURSING
Nurses Note -- 4 Eyes      5/14/2024   8:28 PM      Skin assessed during: Admit      [x] No Altered Skin Integrity Present    []Prevention Measures Documented      [] Yes- Altered Skin Integrity Present or Discovered   [] LDA Added if Not in Epic (Describe Wound)   [] New Altered Skin Integrity was Present on Admit and Documented in LDA   [] Wound Image Taken    Wound Care Consulted? No    Attending Nurse:  MARISA Christian    Second RN/Staff Member:   MARISA Oliveros

## 2024-05-15 NOTE — ANESTHESIA POSTPROCEDURE EVALUATION
Anesthesia Post Evaluation    Patient: Mary Avitia    Procedure(s) Performed: Procedure(s) (LRB):  XI ROBOTIC CHOLECYSTECTOMY, subtotal cholecystectomy (N/A)    Final Anesthesia Type: general      Patient location during evaluation: PACU  Patient participation: Yes- Able to Participate  Level of consciousness: awake and alert  Post-procedure vital signs: reviewed and stable  Pain management: adequate  Airway patency: patent    PONV status at discharge: No PONV  Anesthetic complications: no      Cardiovascular status: stable  Respiratory status: unassisted and spontaneous ventilation  Hydration status: euvolemic  Follow-up not needed.              Vitals Value Taken Time   /82 05/15/24 1540   Temp 36.7 °C (98.1 °F) 05/15/24 1540   Pulse 67 05/15/24 1540   Resp 18 05/15/24 1540   SpO2 99 % 05/15/24 1540         Event Time   Out of Recovery 18:00:00         Pain/Jose Daniel Score: Pain Rating Prior to Med Admin: 8 (5/15/2024  3:14 PM)  Pain Rating Post Med Admin: 3 (5/15/2024 11:29 AM)  Jose Daniel Score: 9 (5/14/2024  6:00 PM)

## 2024-05-15 NOTE — SUBJECTIVE & OBJECTIVE
Interval History: No acute issues overnight. AF/HDS. Tolerating CLD. Pain well controlled. Drain ss with 110ml output since surgery.    Medications:  Continuous Infusions:   sodium chloride 0.9%   Intravenous Continuous 50 mL/hr at 05/14/24 1752 New Bag at 05/14/24 1752     Scheduled Meds:   acetaminophen  1,000 mg Intravenous Q8H    enoxparin  40 mg Subcutaneous Q24H (prophylaxis, 1700)    gabapentin  300 mg Oral TID    methocarbamoL  500 mg Oral QID    mupirocin  1 g Nasal BID    pantoprazole  40 mg Oral Daily    piperacillin-tazobactam (Zosyn) IV (PEDS and ADULTS) (extended infusion is not appropriate)  4.5 g Intravenous Q8H    polyethylene glycol  17 g Oral Daily     PRN Meds:  Current Facility-Administered Medications:     naloxone, 0.02 mg, Intravenous, PRN    ondansetron, 8 mg, Oral, Q8H PRN    oxyCODONE, 5 mg, Oral, Q4H PRN    oxyCODONE, 10 mg, Oral, Q4H PRN     Review of patient's allergies indicates:   Allergen Reactions    Latex, natural rubber Itching     Pt reports itching of hands after wearing latex gloves.     Objective:     Vital Signs (Most Recent):  Temp: 98 °F (36.7 °C) (05/15/24 0704)  Pulse: 73 (05/15/24 0704)  Resp: 16 (05/15/24 0930)  BP: 114/63 (05/15/24 0704)  SpO2: 96 % (05/15/24 0704) Vital Signs (24h Range):  Temp:  [97.2 °F (36.2 °C)-99.1 °F (37.3 °C)] 98 °F (36.7 °C)  Pulse:  [66-88] 73  Resp:  [13-23] 16  SpO2:  [96 %-100 %] 96 %  BP: (114-143)/(63-83) 114/63     Weight: 60.6 kg (133 lb 9.6 oz)  Body mass index is 22.58 kg/m².    Intake/Output - Last 3 Shifts         05/13 0700  05/14 0659 05/14 0700  05/15 0659 05/15 0700 05/16 0659    IV Piggyback  1849.9     Total Intake(mL/kg)  1849.9 (30.5)     Urine (mL/kg/hr)  1425     Drains  110     Blood  100     Total Output  1635     Net  +214.9                     Physical Exam  Vitals and nursing note reviewed.   Constitutional:       Appearance: Normal appearance.   Cardiovascular:      Rate and Rhythm: Normal rate and regular rhythm.    Pulmonary:      Effort: Pulmonary effort is normal. No respiratory distress.   Abdominal:      Palpations: Abdomen is soft.      Comments: Abdomen appropriately TTP  Incisions CDI with dermabond   Skin:     General: Skin is warm and dry.      Capillary Refill: Capillary refill takes less than 2 seconds.   Neurological:      Mental Status: She is alert and oriented to person, place, and time. Mental status is at baseline.          Significant Labs:  I have reviewed all pertinent lab results within the past 24 hours.  CBC:   Recent Labs   Lab 05/15/24  0313   WBC 14.38*   RBC 4.12   HGB 11.8*   HCT 36.5*      MCV 89   MCH 28.6   MCHC 32.3     CMP:   Recent Labs   Lab 05/15/24  0313   *   CALCIUM 8.3*   ALBUMIN 3.3*   PROT 6.6   *   K 3.8   CO2 20*      BUN 9   CREATININE 0.8   ALKPHOS 83   ALT 86*   *   BILITOT 0.5       Significant Diagnostics:  I have reviewed all pertinent imaging results/findings within the past 24 hours.

## 2024-05-15 NOTE — PROGRESS NOTES
[facility]  General Surgery  Progress Note    Subjective:     History of Present Illness:  No notes on file    Post-Op Info:  Procedure(s) (LRB):  XI ROBOTIC CHOLECYSTECTOMY, subtotal cholecystectomy (N/A)   1 Day Post-Op     Interval History: No acute issues overnight. AF/HDS. Tolerating CLD. Pain well controlled. Drain ss with 110ml output since surgery.    Medications:  Continuous Infusions:   sodium chloride 0.9%   Intravenous Continuous 50 mL/hr at 05/14/24 1752 New Bag at 05/14/24 1752     Scheduled Meds:   acetaminophen  1,000 mg Intravenous Q8H    enoxparin  40 mg Subcutaneous Q24H (prophylaxis, 1700)    gabapentin  300 mg Oral TID    methocarbamoL  500 mg Oral QID    mupirocin  1 g Nasal BID    pantoprazole  40 mg Oral Daily    piperacillin-tazobactam (Zosyn) IV (PEDS and ADULTS) (extended infusion is not appropriate)  4.5 g Intravenous Q8H    polyethylene glycol  17 g Oral Daily     PRN Meds:  Current Facility-Administered Medications:     naloxone, 0.02 mg, Intravenous, PRN    ondansetron, 8 mg, Oral, Q8H PRN    oxyCODONE, 5 mg, Oral, Q4H PRN    oxyCODONE, 10 mg, Oral, Q4H PRN     Review of patient's allergies indicates:   Allergen Reactions    Latex, natural rubber Itching     Pt reports itching of hands after wearing latex gloves.     Objective:     Vital Signs (Most Recent):  Temp: 98 °F (36.7 °C) (05/15/24 0704)  Pulse: 73 (05/15/24 0704)  Resp: 16 (05/15/24 0930)  BP: 114/63 (05/15/24 0704)  SpO2: 96 % (05/15/24 0704) Vital Signs (24h Range):  Temp:  [97.2 °F (36.2 °C)-99.1 °F (37.3 °C)] 98 °F (36.7 °C)  Pulse:  [66-88] 73  Resp:  [13-23] 16  SpO2:  [96 %-100 %] 96 %  BP: (114-143)/(63-83) 114/63     Weight: 60.6 kg (133 lb 9.6 oz)  Body mass index is 22.58 kg/m².    Intake/Output - Last 3 Shifts         05/13 0700 05/14 0659 05/14 0700  05/15 0659 05/15 0700  05/16 0659    IV Piggyback  1849.9     Total Intake(mL/kg)  1849.9 (30.5)     Urine (mL/kg/hr)  1425     Drains  110     Blood  100      Total Output  1635     Net  +214.9                     Physical Exam  Vitals and nursing note reviewed.   Constitutional:       Appearance: Normal appearance.   Cardiovascular:      Rate and Rhythm: Normal rate and regular rhythm.   Pulmonary:      Effort: Pulmonary effort is normal. No respiratory distress.   Abdominal:      Palpations: Abdomen is soft.      Comments: Abdomen appropriately TTP  Incisions CDI with dermabond   Skin:     General: Skin is warm and dry.      Capillary Refill: Capillary refill takes less than 2 seconds.   Neurological:      Mental Status: She is alert and oriented to person, place, and time. Mental status is at baseline.          Significant Labs:  I have reviewed all pertinent lab results within the past 24 hours.  CBC:   Recent Labs   Lab 05/15/24  0313   WBC 14.38*   RBC 4.12   HGB 11.8*   HCT 36.5*      MCV 89   MCH 28.6   MCHC 32.3     CMP:   Recent Labs   Lab 05/15/24  0313   *   CALCIUM 8.3*   ALBUMIN 3.3*   PROT 6.6   *   K 3.8   CO2 20*      BUN 9   CREATININE 0.8   ALKPHOS 83   ALT 86*   *   BILITOT 0.5       Significant Diagnostics:  I have reviewed all pertinent imaging results/findings within the past 24 hours.  Assessment/Plan:     * Thickening of wall of gallbladder with pericholecystic fluid  54 y.o. female with hx of gallbladder wall thickening, chronic cholecystitis vs cancer. She is now 1 Day Post-Op robotic converted to open subtotal cholecystectomy. Multiple intra-op frozens negative for malignancy.     - Continue CLD. Will evaluate around lunch time to possibly advance diet  - Discontinue PCA  - MM pain control  - Discontinue IVF  - Discontinue solitario  - Lovenox  - Daily labs  - PT/OT        Marie Esposito MD  General Surgery  [facility]

## 2024-05-15 NOTE — NURSING TRANSFER
Nursing Transfer Note      5/14/2024   8:16 PM    Nurse giving handoff:Susie CUNNINGHAM PACU  Nurse receiving handoff:Anahi SEGURA    Reason patient is being transferred: s/p anesthesia recovery    Transfer To: Room 1028    Transfer via bed    Transfer with iv pump and CO2 monitor    Transported by this RN and PACU PCT    Transfer Vital Signs: see flowsheet    Telemetry: Rate 70 and Rhythm NSR  Order for Tele Monitor? No    Additional Lines: Allan Catheter    Medicines sent: iv medications infusing (abx and PCA analgesia)    Any special needs or follow-up needed: none    Patient belongings transferred with patient: Yes    Chart send with patient: Yes    Notified: spouse    Patient reassessed at: 5/14/24 at 1945 (date, time)    Upon arrival to floor: patient oriented to room, call bell in reach, and bed in lowest position

## 2024-05-15 NOTE — NURSING
"[facility] Plan of Care Note    Dx: Thickening of wall of gallbladder with pericholecystic fluid [K82.8]  Calculus of gallbladder without cholecystitis without obstruction [K80.20]    Shift Events: pain control, ambulation, solitario removed, voiding spontaneously    Goals of Care: pain control, ambulation     Neuro: WNL    Vital Signs: /82 (BP Location: Right arm, Patient Position: Sitting)   Pulse 67   Temp 98.1 °F (36.7 °C) (Oral)   Resp 18   Ht 5' 4.5" (1.638 m)   Wt 60.6 kg (133 lb 9.6 oz)   LMP 11/05/2020   SpO2 99%   Breastfeeding No   BMI 22.58 kg/m²     Respiratory: WNL    Diet: Diet Adult Regular (IDDSI Level 7) No Soft Drinks      Is patient tolerating current diet? yes    GTTS: n/a    Urine Output/Bowel Movement:     Intake/Output Summary (Last 24 hours) at 5/15/2024 1815  Last data filed at 5/15/2024 1814  Gross per 24 hour   Intake 1145.23 ml   Output 2535 ml   Net -1389.77 ml          Drains/Tubes/Tube Feeds (include total output/shift):   Output by Drain (mL) 05/13/24 0701 - 05/13/24 1900 05/13/24 1901 - 05/14/24 0700 05/14/24 0701 - 05/14/24 1900 05/14/24 1901 - 05/15/24 0700 05/15/24 0701 - 05/15/24 1815        Closed/Suction Drain 05/14/24 1650 Tube - 1 Right RLQ Bulb 19 Fr.    110       JOSÉ ANTONIO output- 40 ml    Lines:       Accuchecks:n/a    Skin: 3 lap sites and midline incision    Fall Risk Score: see flowsheet    Activity level? independent    Any scheduled procedures? N/a    Any safety concerns? N/a    Other: n/a    "

## 2024-05-15 NOTE — PLAN OF CARE
Problem: Adult Inpatient Plan of Care  Goal: Plan of Care Review  5/15/2024 0728 by Bernice Casillas RN  Outcome: Progressing  5/15/2024 0728 by Bernice Casillas RN  Outcome: Progressing  Goal: Patient-Specific Goal (Individualized)  5/15/2024 0728 by Bernice Casillas RN  Outcome: Progressing  5/15/2024 0728 by Bernice Casillas, RN  Outcome: Progressing  Goal: Absence of Hospital-Acquired Illness or Injury  5/15/2024 0728 by Bernice Casillas RN  Outcome: Progressing  5/15/2024 0728 by Bernice Casillas RN  Outcome: Progressing  Goal: Optimal Comfort and Wellbeing  5/15/2024 0728 by Bernice Casillas RN  Outcome: Progressing  5/15/2024 0728 by Bernice Casillas RN  Outcome: Progressing  Goal: Readiness for Transition of Care  5/15/2024 0728 by Bernice Casillas RN  Outcome: Progressing  5/15/2024 0728 by Bernice Casillas, RN  Outcome: Progressing     Problem: Wound  Goal: Optimal Coping  5/15/2024 0728 by Bernice Casillas RN  Outcome: Progressing  5/15/2024 0728 by Bernice Casillas RN  Outcome: Progressing  Goal: Optimal Functional Ability  5/15/2024 0728 by Bernice Casillas RN  Outcome: Progressing  5/15/2024 0728 by Bernice Casillas RN  Outcome: Progressing  Goal: Absence of Infection Signs and Symptoms  5/15/2024 0728 by Bernice Casillas, RN  Outcome: Progressing  5/15/2024 0728 by Bernice Casillas, RN  Outcome: Progressing  Goal: Improved Oral Intake  5/15/2024 0728 by Bernice Casillas, RN  Outcome: Progressing  5/15/2024 0728 by Bernice Casillas, RN  Outcome: Progressing  Goal: Optimal Pain Control and Function  5/15/2024 0728 by Bernice Casillas, RN  Outcome: Progressing  5/15/2024 0728 by Bernice Casillas RN  Outcome: Progressing  Goal: Skin Health and Integrity  5/15/2024 0728 by Bernice Casillas RN  Outcome: Progressing  5/15/2024 0728 by Bernice Casillas, RN  Outcome: Progressing  Goal: Optimal Wound Healing  5/15/2024 0728 by Bernice Casillas, RN  Outcome: Progressing  5/15/2024 0728 by Bernice Casillas, RN  Outcome: Progressing     Problem:  Infection  Goal: Absence of Infection Signs and Symptoms  5/15/2024 0728 by Bernice Casillas RN  Outcome: Progressing  5/15/2024 0728 by Bernice Casillas RN  Outcome: Progressing     Problem: Fall Injury Risk  Goal: Absence of Fall and Fall-Related Injury  5/15/2024 0728 by Bernice Casillas RN  Outcome: Progressing  5/15/2024 0728 by Bernice Casillas RN  Outcome: Progressing     Problem: Skin Injury Risk Increased  Goal: Skin Health and Integrity  5/15/2024 0728 by Bernice Casillas RN  Outcome: Progressing  5/15/2024 0728 by Bernice Casillas RN  Outcome: Progressing     Problem: Pain Acute  Goal: Optimal Pain Control and Function  5/15/2024 0728 by Bernice Casillas RN  Outcome: Progressing  5/15/2024 0728 by Bernice Casillas RN  Outcome: Progressing

## 2024-05-16 VITALS
BODY MASS INDEX: 22.26 KG/M2 | DIASTOLIC BLOOD PRESSURE: 69 MMHG | HEART RATE: 83 BPM | WEIGHT: 133.63 LBS | SYSTOLIC BLOOD PRESSURE: 116 MMHG | OXYGEN SATURATION: 98 % | RESPIRATION RATE: 16 BRPM | HEIGHT: 65 IN | TEMPERATURE: 98 F

## 2024-05-16 LAB
ALBUMIN SERPL BCP-MCNC: 3 G/DL (ref 3.5–5.2)
ALP SERPL-CCNC: 80 U/L (ref 55–135)
ALT SERPL W/O P-5'-P-CCNC: 51 U/L (ref 10–44)
ANION GAP SERPL CALC-SCNC: 10 MMOL/L (ref 8–16)
AST SERPL-CCNC: 65 U/L (ref 10–40)
BASOPHILS # BLD AUTO: 0.03 K/UL (ref 0–0.2)
BASOPHILS NFR BLD: 0.2 % (ref 0–1.9)
BILIRUB SERPL-MCNC: 0.4 MG/DL (ref 0.1–1)
BUN SERPL-MCNC: 7 MG/DL (ref 6–20)
CALCIUM SERPL-MCNC: 8.2 MG/DL (ref 8.7–10.5)
CHLORIDE SERPL-SCNC: 106 MMOL/L (ref 95–110)
CO2 SERPL-SCNC: 22 MMOL/L (ref 23–29)
CREAT SERPL-MCNC: 0.8 MG/DL (ref 0.5–1.4)
DIFFERENTIAL METHOD BLD: ABNORMAL
EOSINOPHIL # BLD AUTO: 0.1 K/UL (ref 0–0.5)
EOSINOPHIL NFR BLD: 0.4 % (ref 0–8)
ERYTHROCYTE [DISTWIDTH] IN BLOOD BY AUTOMATED COUNT: 15.2 % (ref 11.5–14.5)
EST. GFR  (NO RACE VARIABLE): >60 ML/MIN/1.73 M^2
GLUCOSE SERPL-MCNC: 84 MG/DL (ref 70–110)
HCT VFR BLD AUTO: 34.5 % (ref 37–48.5)
HGB BLD-MCNC: 11.1 G/DL (ref 12–16)
IMM GRANULOCYTES # BLD AUTO: 0.07 K/UL (ref 0–0.04)
IMM GRANULOCYTES NFR BLD AUTO: 0.6 % (ref 0–0.5)
LYMPHOCYTES # BLD AUTO: 1.6 K/UL (ref 1–4.8)
LYMPHOCYTES NFR BLD: 12.8 % (ref 18–48)
MAGNESIUM SERPL-MCNC: 1.8 MG/DL (ref 1.6–2.6)
MCH RBC QN AUTO: 28.9 PG (ref 27–31)
MCHC RBC AUTO-ENTMCNC: 32.2 G/DL (ref 32–36)
MCV RBC AUTO: 90 FL (ref 82–98)
MONOCYTES # BLD AUTO: 0.6 K/UL (ref 0.3–1)
MONOCYTES NFR BLD: 5 % (ref 4–15)
NEUTROPHILS # BLD AUTO: 10 K/UL (ref 1.8–7.7)
NEUTROPHILS NFR BLD: 81 % (ref 38–73)
NRBC BLD-RTO: 0 /100 WBC
PHOSPHATE SERPL-MCNC: 2 MG/DL (ref 2.7–4.5)
PLATELET # BLD AUTO: 336 K/UL (ref 150–450)
PMV BLD AUTO: 9.2 FL (ref 9.2–12.9)
POTASSIUM SERPL-SCNC: 3.5 MMOL/L (ref 3.5–5.1)
PROT SERPL-MCNC: 6.2 G/DL (ref 6–8.4)
RBC # BLD AUTO: 3.84 M/UL (ref 4–5.4)
SODIUM SERPL-SCNC: 138 MMOL/L (ref 136–145)
WBC # BLD AUTO: 12.29 K/UL (ref 3.9–12.7)

## 2024-05-16 PROCEDURE — 25000003 PHARM REV CODE 250: Performed by: STUDENT IN AN ORGANIZED HEALTH CARE EDUCATION/TRAINING PROGRAM

## 2024-05-16 PROCEDURE — 84100 ASSAY OF PHOSPHORUS: CPT | Performed by: STUDENT IN AN ORGANIZED HEALTH CARE EDUCATION/TRAINING PROGRAM

## 2024-05-16 PROCEDURE — 85025 COMPLETE CBC W/AUTO DIFF WBC: CPT | Performed by: STUDENT IN AN ORGANIZED HEALTH CARE EDUCATION/TRAINING PROGRAM

## 2024-05-16 PROCEDURE — 63600175 PHARM REV CODE 636 W HCPCS: Performed by: STUDENT IN AN ORGANIZED HEALTH CARE EDUCATION/TRAINING PROGRAM

## 2024-05-16 PROCEDURE — 36415 COLL VENOUS BLD VENIPUNCTURE: CPT | Performed by: STUDENT IN AN ORGANIZED HEALTH CARE EDUCATION/TRAINING PROGRAM

## 2024-05-16 PROCEDURE — 25000003 PHARM REV CODE 250: Performed by: NURSE PRACTITIONER

## 2024-05-16 PROCEDURE — 80053 COMPREHEN METABOLIC PANEL: CPT | Performed by: STUDENT IN AN ORGANIZED HEALTH CARE EDUCATION/TRAINING PROGRAM

## 2024-05-16 PROCEDURE — 83735 ASSAY OF MAGNESIUM: CPT | Performed by: STUDENT IN AN ORGANIZED HEALTH CARE EDUCATION/TRAINING PROGRAM

## 2024-05-16 PROCEDURE — 25000003 PHARM REV CODE 250

## 2024-05-16 RX ORDER — OXYCODONE HYDROCHLORIDE 5 MG/1
5 TABLET ORAL EVERY 6 HOURS PRN
Qty: 20 TABLET | Refills: 0 | Status: SHIPPED | OUTPATIENT
Start: 2024-05-16 | End: 2024-05-18 | Stop reason: SDUPTHER

## 2024-05-16 RX ORDER — GABAPENTIN 300 MG/1
300 CAPSULE ORAL EVERY 8 HOURS PRN
Qty: 60 CAPSULE | Refills: 0 | Status: SHIPPED | OUTPATIENT
Start: 2024-05-16 | End: 2024-06-05

## 2024-05-16 RX ORDER — AMOXICILLIN AND CLAVULANATE POTASSIUM 875; 125 MG/1; MG/1
1 TABLET, FILM COATED ORAL EVERY 12 HOURS
Qty: 4 TABLET | Refills: 0 | Status: SHIPPED | OUTPATIENT
Start: 2024-05-16 | End: 2024-05-18

## 2024-05-16 RX ORDER — AMOXICILLIN AND CLAVULANATE POTASSIUM 875; 125 MG/1; MG/1
1 TABLET, FILM COATED ORAL EVERY 12 HOURS
Status: DISCONTINUED | OUTPATIENT
Start: 2024-05-16 | End: 2024-05-16 | Stop reason: HOSPADM

## 2024-05-16 RX ADMIN — PANTOPRAZOLE SODIUM 40 MG: 40 TABLET, DELAYED RELEASE ORAL at 09:05

## 2024-05-16 RX ADMIN — METHOCARBAMOL 500 MG: 500 TABLET ORAL at 09:05

## 2024-05-16 RX ADMIN — OXYCODONE HYDROCHLORIDE 10 MG: 10 TABLET ORAL at 10:05

## 2024-05-16 RX ADMIN — POLYETHYLENE GLYCOL 3350 17 G: 17 POWDER, FOR SOLUTION ORAL at 09:05

## 2024-05-16 RX ADMIN — GABAPENTIN 300 MG: 300 CAPSULE ORAL at 09:05

## 2024-05-16 RX ADMIN — AMOXICILLIN AND CLAVULANATE POTASSIUM 1 TABLET: 875; 125 TABLET, FILM COATED ORAL at 10:05

## 2024-05-16 RX ADMIN — OXYCODONE HYDROCHLORIDE 10 MG: 10 TABLET ORAL at 05:05

## 2024-05-16 RX ADMIN — PIPERACILLIN SODIUM AND TAZOBACTAM SODIUM 4.5 G: 4; .5 INJECTION, POWDER, FOR SOLUTION INTRAVENOUS at 09:05

## 2024-05-16 RX ADMIN — PIPERACILLIN SODIUM AND TAZOBACTAM SODIUM 4.5 G: 4; .5 INJECTION, POWDER, FOR SOLUTION INTRAVENOUS at 01:05

## 2024-05-16 NOTE — ASSESSMENT & PLAN NOTE
54 y.o. female with hx of gallbladder wall thickening, chronic cholecystitis vs cancer. She is now 2 Days Post-Op robotic converted to open subtotal cholecystectomy. Multiple intra-op frozens negative for malignancy.     - Regular  - MM pain control  - Lovenox  - Daily labs  - PT/OT  - JOSÉ ANTONIO drain teaching    Dispo: Home, drain to stay in place at discharge

## 2024-05-16 NOTE — PLAN OF CARE
Problem: Adult Inpatient Plan of Care  Goal: Plan of Care Review  Outcome: Progressing  Goal: Patient-Specific Goal (Individualized)  Outcome: Progressing  Goal: Absence of Hospital-Acquired Illness or Injury  Outcome: Progressing  Goal: Optimal Comfort and Wellbeing  Outcome: Progressing  Goal: Readiness for Transition of Care  Outcome: Progressing     Problem: Wound  Goal: Optimal Coping  Outcome: Progressing  Goal: Optimal Functional Ability  Outcome: Progressing  Goal: Absence of Infection Signs and Symptoms  Outcome: Progressing  Goal: Improved Oral Intake  Outcome: Progressing  Goal: Optimal Pain Control and Function  Outcome: Progressing  Goal: Skin Health and Integrity  Outcome: Progressing  Goal: Optimal Wound Healing  Outcome: Progressing     Problem: Infection  Goal: Absence of Infection Signs and Symptoms  Outcome: Progressing     Problem: Fall Injury Risk  Goal: Absence of Fall and Fall-Related Injury  Outcome: Progressing     Problem: Skin Injury Risk Increased  Goal: Skin Health and Integrity  Outcome: Progressing     Problem: Pain Acute  Goal: Optimal Pain Control and Function  Outcome: Progressing

## 2024-05-16 NOTE — PLAN OF CARE
[facility]  Discharge Final Note    Primary Care Provider: Kai Tobin MD  Expected Discharge Date: 5/16/2024    Patient medically ready for discharge to home.     Transportation by family.     Is family/patient aware of discharge: yes     Hospital follow up scheduled: yes       Final Discharge Note (most recent)       Final Note - 05/16/24 1431          Final Note    Assessment Type Final Discharge Note     Anticipated Discharge Disposition Home or Self Care     Hospital Resources/Appts/Education Provided Provided patient/caregiver with written discharge plan information                     Important Message from Medicare         Referral Info (most recent)       Referral Info    No documentation.                 Contact Info       Avtar De Leon MD   Specialty: Surgical Oncology, General Surgery    [address] [facility] [address]   Phone: [phone]       Next Steps: Follow up in 1 week(s)          Future Appointments   Date Time Provider Department Center   5/29/2024 10:30 AM Gretchen Hitchcock NP [facility] [place]   6/24/2024  8:20 AM LABORATORY, [place] [facility] Arleen Cagle RN  Case Management  Ext: [phone]  05/16/2024

## 2024-05-16 NOTE — PLAN OF CARE
Problem: Adult Inpatient Plan of Care  Goal: Plan of Care Review  Outcome: Met  Goal: Patient-Specific Goal (Individualized)  Outcome: Met  Goal: Absence of Hospital-Acquired Illness or Injury  Outcome: Met  Goal: Optimal Comfort and Wellbeing  Outcome: Met  Goal: Readiness for Transition of Care  Outcome: Met     Problem: Wound  Goal: Optimal Coping  Outcome: Met  Goal: Optimal Functional Ability  Outcome: Met  Goal: Absence of Infection Signs and Symptoms  Outcome: Met  Goal: Improved Oral Intake  Outcome: Met  Goal: Optimal Pain Control and Function  Outcome: Met  Goal: Skin Health and Integrity  Outcome: Met  Goal: Optimal Wound Healing  Outcome: Met     Problem: Infection  Goal: Absence of Infection Signs and Symptoms  Outcome: Met     Problem: Fall Injury Risk  Goal: Absence of Fall and Fall-Related Injury  Outcome: Met     Problem: Skin Injury Risk Increased  Goal: Skin Health and Integrity  Outcome: Met     Problem: Pain Acute  Goal: Optimal Pain Control and Function  Outcome: Met

## 2024-05-16 NOTE — PROGRESS NOTES
[facility]  General Surgery  Progress Note    Subjective:     History of Present Illness:  No notes on file    Post-Op Info:  Procedure(s) (LRB):  XI ROBOTIC CHOLECYSTECTOMY, subtotal cholecystectomy (N/A)   2 Days Post-Op     Interval History: No acute events overnight. Pain well controlled. Tolerating diet without nausea. Ambulating without issue, having bowel movements.     Medications:  Continuous Infusions:  Scheduled Meds:   enoxparin  40 mg Subcutaneous Q24H (prophylaxis, 1700)    gabapentin  300 mg Oral TID    methocarbamoL  500 mg Oral QID    mupirocin  1 g Nasal BID    pantoprazole  40 mg Oral Daily    piperacillin-tazobactam (Zosyn) IV (PEDS and ADULTS) (extended infusion is not appropriate)  4.5 g Intravenous Q8H    polyethylene glycol  17 g Oral BID     PRN Meds:  Current Facility-Administered Medications:     naloxone, 0.02 mg, Intravenous, PRN    ondansetron, 8 mg, Oral, Q8H PRN    oxyCODONE, 5 mg, Oral, Q4H PRN    oxyCODONE, 10 mg, Oral, Q4H PRN     Review of patient's allergies indicates:   Allergen Reactions    Latex, natural rubber Itching     Pt reports itching of hands after wearing latex gloves.     Objective:     Vital Signs (Most Recent):  Temp: 98.5 °F (36.9 °C) (05/16/24 0816)  Pulse: 69 (05/16/24 0816)  Resp: 16 (05/16/24 0816)  BP: (!) 143/81 (05/16/24 0816)  SpO2: 97 % (05/16/24 0816) Vital Signs (24h Range):  Temp:  [97.8 °F (36.6 °C)-98.5 °F (36.9 °C)] 98.5 °F (36.9 °C)  Pulse:  [65-78] 69  Resp:  [14-20] 16  SpO2:  [95 %-99 %] 97 %  BP: (118-147)/(72-82) 143/81     Weight: 60.6 kg (133 lb 9.6 oz)  Body mass index is 22.58 kg/m².    Intake/Output - Last 3 Shifts         05/14 0700  05/15 0659 05/15 0700 05/16 0659 05/16 0700 05/17 0659    P.O.  840     IV Piggyback 1849.9 255.3     Total Intake(mL/kg) 1849.9 (30.5) 1095.3 (18.1)     Urine (mL/kg/hr) 1425 1000 (0.7)     Drains 110 70     Stool  0     Blood 100      Total Output 1635 1070     Net +214.9 +25.3            Stool  Occurrence  1 x              Physical Exam  Vitals and nursing note reviewed.   Constitutional:       Appearance: Normal appearance.   Cardiovascular:      Rate and Rhythm: Normal rate and regular rhythm.   Pulmonary:      Effort: Pulmonary effort is normal. No respiratory distress.   Abdominal:      Palpations: Abdomen is soft.      Comments: Abdomen appropriately TTP  Incisions CDI with dermabond   Skin:     General: Skin is warm and dry.      Capillary Refill: Capillary refill takes less than 2 seconds.   Neurological:      Mental Status: She is alert and oriented to person, place, and time. Mental status is at baseline.          Significant Labs:  I have reviewed all pertinent lab results within the past 24 hours.  CBC:   Recent Labs   Lab 05/16/24  0305   WBC 12.29   RBC 3.84*   HGB 11.1*   HCT 34.5*      MCV 90   MCH 28.9   MCHC 32.2     CMP:   Recent Labs   Lab 05/16/24  0305   GLU 84   CALCIUM 8.2*   ALBUMIN 3.0*   PROT 6.2      K 3.5   CO2 22*      BUN 7   CREATININE 0.8   ALKPHOS 80   ALT 51*   AST 65*   BILITOT 0.4       Significant Diagnostics:  I have reviewed all pertinent imaging results/findings within the past 24 hours.  Assessment/Plan:     * Thickening of wall of gallbladder with pericholecystic fluid  54 y.o. female with hx of gallbladder wall thickening, chronic cholecystitis vs cancer. She is now 2 Days Post-Op robotic converted to open subtotal cholecystectomy. Multiple intra-op frozens negative for malignancy.     - Regular  - MM pain control  - Lovenox  - Daily labs  - PT/OT  - JOSÉ ANTONIO drain teaching    Dispo: Home, drain to stay in place at discharge        Michael Waddell MD  General Surgery  [facility]

## 2024-05-16 NOTE — SUBJECTIVE & OBJECTIVE
Interval History: No acute events overnight. Pain well controlled. Tolerating diet without nausea. Ambulating without issue, having bowel movements.     Medications:  Continuous Infusions:  Scheduled Meds:   enoxparin  40 mg Subcutaneous Q24H (prophylaxis, 1700)    gabapentin  300 mg Oral TID    methocarbamoL  500 mg Oral QID    mupirocin  1 g Nasal BID    pantoprazole  40 mg Oral Daily    piperacillin-tazobactam (Zosyn) IV (PEDS and ADULTS) (extended infusion is not appropriate)  4.5 g Intravenous Q8H    polyethylene glycol  17 g Oral BID     PRN Meds:  Current Facility-Administered Medications:     naloxone, 0.02 mg, Intravenous, PRN    ondansetron, 8 mg, Oral, Q8H PRN    oxyCODONE, 5 mg, Oral, Q4H PRN    oxyCODONE, 10 mg, Oral, Q4H PRN     Review of patient's allergies indicates:   Allergen Reactions    Latex, natural rubber Itching     Pt reports itching of hands after wearing latex gloves.     Objective:     Vital Signs (Most Recent):  Temp: 98.5 °F (36.9 °C) (05/16/24 0816)  Pulse: 69 (05/16/24 0816)  Resp: 16 (05/16/24 0816)  BP: (!) 143/81 (05/16/24 0816)  SpO2: 97 % (05/16/24 0816) Vital Signs (24h Range):  Temp:  [97.8 °F (36.6 °C)-98.5 °F (36.9 °C)] 98.5 °F (36.9 °C)  Pulse:  [65-78] 69  Resp:  [14-20] 16  SpO2:  [95 %-99 %] 97 %  BP: (118-147)/(72-82) 143/81     Weight: 60.6 kg (133 lb 9.6 oz)  Body mass index is 22.58 kg/m².    Intake/Output - Last 3 Shifts         05/14 0700  05/15 0659 05/15 0700  05/16 0659 05/16 0700 05/17 0659    P.O.  840     IV Piggyback 1849.9 255.3     Total Intake(mL/kg) 1849.9 (30.5) 1095.3 (18.1)     Urine (mL/kg/hr) 1425 1000 (0.7)     Drains 110 70     Stool  0     Blood 100      Total Output 1635 1070     Net +214.9 +25.3            Stool Occurrence  1 x              Physical Exam  Vitals and nursing note reviewed.   Constitutional:       Appearance: Normal appearance.   Cardiovascular:      Rate and Rhythm: Normal rate and regular rhythm.   Pulmonary:      Effort:  Pulmonary effort is normal. No respiratory distress.   Abdominal:      Palpations: Abdomen is soft.      Comments: Abdomen appropriately TTP  Incisions CDI with dermabond   Skin:     General: Skin is warm and dry.      Capillary Refill: Capillary refill takes less than 2 seconds.   Neurological:      Mental Status: She is alert and oriented to person, place, and time. Mental status is at baseline.          Significant Labs:  I have reviewed all pertinent lab results within the past 24 hours.  CBC:   Recent Labs   Lab 05/16/24  0305   WBC 12.29   RBC 3.84*   HGB 11.1*   HCT 34.5*      MCV 90   MCH 28.9   MCHC 32.2     CMP:   Recent Labs   Lab 05/16/24  0305   GLU 84   CALCIUM 8.2*   ALBUMIN 3.0*   PROT 6.2      K 3.5   CO2 22*      BUN 7   CREATININE 0.8   ALKPHOS 80   ALT 51*   AST 65*   BILITOT 0.4       Significant Diagnostics:  I have reviewed all pertinent imaging results/findings within the past 24 hours.

## 2024-05-16 NOTE — PLAN OF CARE
Problem: Adult Inpatient Plan of Care  Goal: Plan of Care Review  Outcome: Progressing     Problem: Wound  Goal: Optimal Coping  Outcome: Progressing

## 2024-05-16 NOTE — DISCHARGE SUMMARY
[facility]  General Surgery  Discharge Summary      Patient Name: Mary Avitia  MRN: [id number]  Admission Date: 5/14/2024  Hospital Length of Stay: 1 days  Discharge Date and Time:  05/16/2024 10:00 AM  Attending Physician: Atvar De Leon MD   Discharging Provider: Soledad Zacarias NP  Primary Care Provider: Kai Tobin MD    HPI:   Kya Avitia is a 55yo F with a new gallbladder mass discovered on imaging after 2 episodes of self-limited pain last month.       Procedure(s) (LRB):  XI ROBOTIC CHOLECYSTECTOMY, subtotal cholecystectomy (N/A)      Indwelling Lines/Drains at time of discharge:   Lines/Drains/Airways       Drain  Duration                  Closed/Suction Drain 05/14/24 1650 Tube - 1 Right RLQ Bulb 19 Fr. 1 day                  Hospital Course:  Kya Avitia is a 53 y/o female  with hx of gallbladder wall thickening, chronic cholecystitis vs cancer. She is s/p robotic converted to open subtotal cholecystectomy on 5/14/2024.  The patient is tolerating a regular diet.  She is voiding and ambulating without difficulty.  Patient with no complaints of nausea, vomiting, chest pain or shortness of breath.  Her vital signs stable. She is afebrile. She is positive for flatus and positive for bowel sounds.  CV: RRR  Lungs: CTA bilaterally  Abdomen:  Soft, non-tender, non-distended, positive for bowel sounds, incision clean, dry and intact. Right abdomen bulb drain with serosang output.       Goals of Care Treatment Preferences: pain control, ambulation         Consults: PT    Significant Diagnostic Studies: Labs: CMP   Recent Labs   Lab 05/14/24  2145 05/15/24  0313 05/16/24  0305   * 132* 138   K 3.9 3.8 3.5    102 106   CO2 20* 20* 22*   * 117* 84   BUN 9 9 7   CREATININE 0.8 0.8 0.8   CALCIUM 8.1* 8.3* 8.2*   PROT 6.6 6.6 6.2   ALBUMIN 3.3* 3.3* 3.0*   BILITOT 0.5 0.5 0.4   ALKPHOS 85 83 80   * 174* 65*   * 86* 51*   ANIONGAP 9 10 10    and CBC   Recent  Labs   Lab 05/14/24  2145 05/15/24  0313 05/16/24  0305   WBC 15.47* 14.38* 12.29   HGB 12.3 11.8* 11.1*   HCT 36.8* 36.5* 34.5*    368 336       Pending Diagnostic Studies:       Procedure Component Value Units Date/Time    Specimen to Pathology, Surgery General Surgery [[id number]] Collected: 05/14/24 1656    Order Status: Sent Lab Status: In process Updated: 05/15/24 0628    Specimen: Tissue           Final Active Diagnoses:    Diagnosis Date Noted POA    PRINCIPAL PROBLEM:  Thickening of wall of gallbladder with pericholecystic fluid [K82.8] 05/14/2024 Yes    Hyponatremia [E87.1] 05/15/2024 Yes      Problems Resolved During this Admission:      Discharged Condition: good    Disposition: Home or Self Care    Follow Up:   Follow-up Information       Avtar De Leon MD Follow up in 1 week(s).    Specialties: Surgical Oncology, General Surgery  Contact information:  [address] [place] [id number]  [phone]                           Patient Instructions:      Lifting restrictions   Order Comments: No lifting > 10 pounds.  May shower.   Empty right abdomen bulb drain every 12 hours and record output.     Notify your health care provider if you experience any of the following:  temperature >100.4     Notify your health care provider if you experience any of the following:  persistent nausea and vomiting or diarrhea     Notify your health care provider if you experience any of the following:  severe uncontrolled pain     Notify your health care provider if you experience any of the following:  redness, tenderness, or signs of infection (pain, swelling, redness, odor or green/yellow discharge around incision site)     Notify your health care provider if you experience any of the following:  difficulty breathing or increased cough     Notify your health care provider if you experience any of the following:  severe persistent headache     Notify your health care provider if you experience any of the  following:  worsening rash     Notify your health care provider if you experience any of the following:  persistent dizziness, light-headedness, or visual disturbances     Notify your health care provider if you experience any of the following:  increased confusion or weakness     No dressing needed     Activity as tolerated     Medications:  Reconciled Home Medications:      Medication List        START taking these medications      amoxicillin-clavulanate 875-125mg 875-125 mg per tablet  Commonly known as: AUGMENTIN  Take 1 tablet by mouth every 12 (twelve) hours. for 2 days     gabapentin 300 MG capsule  Commonly known as: NEURONTIN  Take 1 capsule (300 mg total) by mouth every 8 (eight) hours as needed (neuropathy).     oxyCODONE 5 MG immediate release tablet  Commonly known as: ROXICODONE  Take 1 tablet (5 mg total) by mouth every 6 (six) hours as needed for Pain.            CHANGE how you take these medications      * ALPRAZolam 0.25 MG tablet  Commonly known as: XANAX  Take 2 tablets (0.5 mg total) by mouth 3 (three) times daily. for 2 days  What changed: Another medication with the same name was added. Make sure you understand how and when to take each.     * ALPRAZolam 0.5 MG tablet  Commonly known as: XANAX  Take 1 tablet (0.5 mg total) by mouth 3 (three) times daily as needed for Anxiety.  What changed: You were already taking a medication with the same name, and this prescription was added. Make sure you understand how and when to take each.           * This list has 2 medication(s) that are the same as other medications prescribed for you. Read the directions carefully, and ask your doctor or other care provider to review them with you.                CONTINUE taking these medications      estradioL 1 MG tablet  Commonly known as: ESTRACE  Take 1 mg by mouth once daily.     FLUoxetine 20 MG capsule  Take 3 capsules (60 mg total) by mouth once daily.     medroxyPROGESTERone 2.5 MG tablet  Commonly known  as: PROVERA  Take 2.5 mg by mouth once daily.     polyethylene glycol 17 gram/dose powder  Commonly known as: GLYCOLAX  Take 17 g by mouth once daily. Mix in water or juice            ASK your doctor about these medications      cyclobenzaprine 10 MG tablet  Commonly known as: FLEXERIL  Uses prn     dicyclomine 20 mg tablet  Commonly known as: BENTYL  Take 20 mg by mouth every 6 (six) hours. As needed.     zolpidem 5 MG Tab  Commonly known as: AMBIEN  Take 1 tablet (5 mg total) by mouth nightly as needed (insomnia).            Time spent on the discharge of patient: 20 minutes    Soledad Zacarias NP  General Surgery  [facility]

## 2024-05-17 ENCOUNTER — PATIENT OUTREACH (OUTPATIENT)
Dept: ADMINISTRATIVE | Facility: CLINIC | Age: 54
End: 2024-05-17
Payer: COMMERCIAL

## 2024-05-17 ENCOUNTER — NURSE TRIAGE (OUTPATIENT)
Dept: ADMINISTRATIVE | Facility: CLINIC | Age: 54
End: 2024-05-17
Payer: COMMERCIAL

## 2024-05-17 ENCOUNTER — TELEPHONE (OUTPATIENT)
Dept: SURGERY | Facility: CLINIC | Age: 54
End: 2024-05-17
Payer: COMMERCIAL

## 2024-05-17 NOTE — TELEPHONE ENCOUNTER
----- Message from Madison Lujan sent at 5/17/2024 12:52 PM CDT -----  Regarding: Pt states shes in severe pain after surgery needs to speak with someone regarding this matter  Contact: [phone]  Name of Who is Calling:BAILEY SIMENTAL [[id number]]        What is the request in detail:Pt states shes in severe pain after surgery needs to speak with someone regarding this matter. Please advise         Can the clinic reply by [facility]:No        What Number to Call Back if not in [facility]: Telephone Information:  Mobile          [phone]

## 2024-05-17 NOTE — TELEPHONE ENCOUNTER
"Returned call. Spoke to pt. Pt reports she is experiencing pain at her incision and "inside". Rates pain 10/10.  Pt reports she is taking oxycodone 5 mg every 2 hours. She states she was taking oxycodone 10 mg or 15 mg while inpatient. Denies fever, nausea, vomiting, diarrhea, redness, swelling and bleeding from surgical site. Reports drain output was 100 ml brown drainage overnight. Currently, drain output is approximately 25 ml. Pt reports she is passing gas and had a bm this morning. Pt is taking gabapentin as well as xanax. Will discuss with Virginie TOLBERT. Pt preferred pharmacy confirmed. Pt verbalized understanding.  Secure chat sent to DAVID TOLBERT with notification pt requesting refill of pain medications.  "

## 2024-05-17 NOTE — PROGRESS NOTES
C3 nurse spoke with Mary Avitia for a [facility] Post Discharge follow-up call. The patient has a scheduled [facility] appointment with the [facility] provider, Bhavya Williamson NP., on 05/24/2024. The patient was explained that the NP will call you to provide a time-frame for the appointment.

## 2024-05-18 ENCOUNTER — PATIENT MESSAGE (OUTPATIENT)
Dept: SURGERY | Facility: CLINIC | Age: 54
End: 2024-05-18
Payer: COMMERCIAL

## 2024-05-18 LAB
BLD PROD TYP BPU: NORMAL
BLD PROD TYP BPU: NORMAL
BLOOD UNIT EXPIRATION DATE: NORMAL
BLOOD UNIT EXPIRATION DATE: NORMAL
BLOOD UNIT TYPE CODE: 5100
BLOOD UNIT TYPE CODE: 5100
BLOOD UNIT TYPE: NORMAL
BLOOD UNIT TYPE: NORMAL
CODING SYSTEM: NORMAL
CODING SYSTEM: NORMAL
CROSSMATCH INTERPRETATION: NORMAL
CROSSMATCH INTERPRETATION: NORMAL
DISPENSE STATUS: NORMAL
DISPENSE STATUS: NORMAL
NUM UNITS TRANS PACKED RBC: NORMAL
NUM UNITS TRANS PACKED RBC: NORMAL

## 2024-05-18 RX ORDER — OXYCODONE HYDROCHLORIDE 5 MG/1
5 TABLET ORAL EVERY 6 HOURS PRN
Qty: 20 TABLET | Refills: 0 | Status: SHIPPED | OUTPATIENT
Start: 2024-05-18

## 2024-05-18 NOTE — TELEPHONE ENCOUNTER
Caller c/o 10/10 pain despite taking pain medication. Caller states she was supposed to receive a call back from office, but hasn't heard from anyone. No on call provider listed in on call finder.  contacted for further assistance, no on call provider per spok. Caller informed, advised to ED for further assistance per policy. Caller refused.   Pt advised per protocol and verbalized understanding.     Reason for Disposition   [1] SEVERE post-op pain (e.g., excruciating, pain scale 8-10) AND [2] not controlled with pain medications    Additional Information   Negative: Sounds like a life-threatening emergency to the triager   Negative: [1] Widespread rash AND [2] bright red, sunburn-like   Negative: [1] SEVERE headache AND [2] after spinal (epidural) anesthesia   Negative: [1] Vomiting AND [2] persists > 4 hours   Negative: [1] Vomiting AND [2] abdomen looks much more swollen than usual   Negative: [1] Drinking very little AND [2] dehydration suspected (e.g., no urine > 12 hours, very dry mouth, very lightheaded)   Negative: Patient sounds very sick or weak to the triager   Negative: Sounds like a serious complication to the triager   Negative: Fever > 100.4 F (38.0 C)    Protocols used: Post-Op Symptoms and [id number]

## 2024-05-20 NOTE — OP NOTE
[facility]  Surgery Department  Operative Note       Date of Procedure: 5/14/2024     Procedure: Procedure(s) (LRB):  XI ROBOTIC CHOLECYSTECTOMY, subtotal cholecystectomy (N/A)     Surgeons and Role:     * Avtar De Leon MD - Primary     * Marie Esposito MD - Resident - Assisting     * Luciano Paulino MD - Resident - Assisting        Pre-Operative Diagnosis: Thickening of wall of gallbladder with pericholecystic fluid [K82.8]    Post-Operative Diagnosis:   Same      Anesthesia: General    Operative Findings:   No evidence of distant intraperitoneal metastases   Multiple frozen sections negative for malignancy    Procedures:  Robotic converted to open subtotal cholecystectomy -- 22 mod    Estimated Blood Loss (EBL):  50 mL           Indications:  Mary Avitia presents for the above procedures.  Risks and benefits were reviewed including bleeding, infection, damage to local structures, cardiovascular and pulmonary complications,  need for additional procedures, death, and imponderables.  She understands and gave informed consent to proceed.    Details:  The patient was transported to the operating room and satisfactory anesthesia established.  Preoperative antibiotics were administered.  The patient was placed in the supine position and extremities were padded and protected throughout. An appropriate timeout was performed.    We entered with a periumbilical Veress and insufflated and then entered the abdomen off midline with an Optiview trocar.  There was no evidence of injury upon entry.  Additional trocars were placed in the robot was docked.  Immediately I have a good deal of concerned that this could be a cancer.  There were adhesions to the gallbladder with a good bit of neovascularization.  After a bit of exploration it is clear that also of the right colon was stuck to the gallbladder quite densely as well as the duodenal.  I am able to work down the omentum and we take a generous swath of  omentum that looked suspicious to me and I sent it for frozen concerned that there may be some localized peritoneal disease here however that frozen returns without evidence of malignancy.  We continued to work and take biopsies of several enlarged nodes in the area as we mobilize the gallbladder and divided the omentum that allows us to retract the gallbladder up a little bit.  The right colon was densely stuck to the lateral edge of the gallbladder and as I worked down onto the duodenal this also was very densely stuck either as involvement of cancer or almost an impending cholecystoduodenal fistula.  At this point I decided to go ahead and open since I do not think we are going to get our answers robotically and I have no safe avenue to continue.  An upper midline incision was made and retraction was established.  I continued to work on mobilizing the structures around the gallbladder.  I am able to expose the right colon which was socked into the gallbladder and get a loop completely around its attachment and then divided this off the gallbladder staying really on gallbladder wall.  I get this down in repair any concern for serosal injury with suture but there was no full-thickness injury.  The duodenal was a different story and the 2nd portion of the duodenal is plastered to the infundibulum of the gallbladder and I do not have a great way to release this without risking a full-thickness duodenal injury which I am not prepared to do yet.  At this point I have sent about for frozens everything was come back as negative and I decided to open the top of the gallbladder.  We excise the fundus of the gallbladder which was remarkably thick walled.  The anterior of the gallbladder is nodular and inflamed and it is impacted with huge stones probably 3 cm stones.  We pulled out about 4 of these I sent the gallbladder fundus to path and again this returns all inflammatory and at this point we just have to work off the  assumption that this is a nonmalignant process in his more reflective of terrible chronic cholecystitis.  Based on this I elected to perform a subtotal fenestrating cholecystectomy and leave the duodenal and infundibular attachments intact and not risk injury to the duodenal.  Once all the stones were removed there was no bile so this is a totally hydroptic  gallbladder.  I can not appreciate any full-thickness communication with the duodenal.  We fulgurated the anterior of the gallbladder with cautery after about an 80% resection of the wall of the gallbladder and what we have left here basically is common wall on the common bile duct and the duodenal.  A 19 Ukrainian Ubaldo drain was brought in and left tacked over the opening of the fenestrated gallbladder.  The abdomen was irrigated.  All of the gallbladder we resected was also sent for pathology but now just for permanent.    The fascia was closed using 0 non-looped PDS in deep bite, shallow advance fashion.  The wound was irrigated and skin closed with Monocryl and dermabond.      All needle, lap, and sponge counts were reported as correct. I communicated the intraoperative findings with the family following the procedure.     This was a complex case that went over and above the technical work required for a standard  resection billed under the appropriate code.  I am attaching a -22 modifier to reflect the additional 200% hours of work demanded by aberrant anatomy, severe chronic fibrosis, operative risk, intraabdominal adhesions and complex intraoperative decision making.    Implants: * No implants in log *    Specimens:   Specimen (24h ago, onward)      None                    Condition: Good    Disposition: PACU - hemodynamically stable.    Attestation: I was present and scrubbed for the entire procedure.

## 2024-05-21 LAB
FINAL PATHOLOGIC DIAGNOSIS: NORMAL
FROZEN SECTION DIAGNOSIS: NORMAL
FROZEN SECTION FOOTNOTE: NORMAL
GROSS: NORMAL
Lab: NORMAL

## 2024-05-23 ENCOUNTER — TELEPHONE (OUTPATIENT)
Dept: SURGERY | Facility: CLINIC | Age: 54
End: 2024-05-23
Payer: COMMERCIAL

## 2024-05-23 NOTE — TELEPHONE ENCOUNTER
----- Message from Savita Rascon sent at 5/23/2024 11:51 AM CDT -----  Regarding: Advise  Contact: [phone]  Patient is calling to speak to MARISA Tellez in regards to questions she has about surgery after care and drainage per pt. Please give pt a call back.

## 2024-05-23 NOTE — TELEPHONE ENCOUNTER
"Returned call. Spoke to pt. Pt reports her drain output has decreased and inquiring if this is normal. Drain output approximately 25 ml and "clear yellow in color". Denies fever, nausea, vomiting diarrhea, redness, swelling pus, and foul odor. Reassurance provided. Reviewed post op appt details. Instructed pt to call the office for any questions or concerns. Pt verbalized understanding.  "

## 2024-05-24 ENCOUNTER — OFFICE VISIT (OUTPATIENT)
Dept: HOME HEALTH SERVICES | Facility: CLINIC | Age: 54
End: 2024-05-24
Payer: COMMERCIAL

## 2024-05-24 ENCOUNTER — PATIENT MESSAGE (OUTPATIENT)
Dept: FAMILY MEDICINE | Facility: CLINIC | Age: 54
End: 2024-05-24
Payer: COMMERCIAL

## 2024-05-24 VITALS
TEMPERATURE: 99 F | DIASTOLIC BLOOD PRESSURE: 70 MMHG | HEART RATE: 72 BPM | OXYGEN SATURATION: 99 % | SYSTOLIC BLOOD PRESSURE: 122 MMHG | RESPIRATION RATE: 20 BRPM

## 2024-05-24 DIAGNOSIS — K82.8 THICKENING OF WALL OF GALLBLADDER WITH PERICHOLECYSTIC FLUID: Primary | ICD-10-CM

## 2024-05-24 DIAGNOSIS — Z90.49 S/P CHOLECYSTECTOMY: ICD-10-CM

## 2024-05-24 PROCEDURE — 1111F DSCHRG MED/CURRENT MED MERGE: CPT | Mod: CPTII,S$GLB,, | Performed by: NURSE PRACTITIONER

## 2024-05-24 PROCEDURE — 3074F SYST BP LT 130 MM HG: CPT | Mod: CPTII,S$GLB,, | Performed by: NURSE PRACTITIONER

## 2024-05-24 PROCEDURE — 3078F DIAST BP <80 MM HG: CPT | Mod: CPTII,S$GLB,, | Performed by: NURSE PRACTITIONER

## 2024-05-24 PROCEDURE — 1159F MED LIST DOCD IN RCRD: CPT | Mod: CPTII,S$GLB,, | Performed by: NURSE PRACTITIONER

## 2024-05-24 PROCEDURE — 1160F RVW MEDS BY RX/DR IN RCRD: CPT | Mod: CPTII,S$GLB,, | Performed by: NURSE PRACTITIONER

## 2024-05-24 PROCEDURE — 99495 TRANSJ CARE MGMT MOD F2F 14D: CPT | Mod: S$GLB,,, | Performed by: NURSE PRACTITIONER

## 2024-05-24 RX ORDER — ALPRAZOLAM 0.5 MG/1
0.5 TABLET ORAL 3 TIMES DAILY PRN
Qty: 20 TABLET | Refills: 0 | OUTPATIENT
Start: 2024-05-24

## 2024-05-24 RX ORDER — ALPRAZOLAM 0.5 MG/1
0.5 TABLET ORAL 3 TIMES DAILY PRN
Qty: 20 TABLET | Refills: 0 | Status: SHIPPED | OUTPATIENT
Start: 2024-05-24 | End: 2024-06-10 | Stop reason: SDUPTHER

## 2024-05-24 NOTE — PATIENT INSTRUCTIONS
Instructions:  - Continue all medications, treatments and therapies as ordered.  - Follow all instructions, recommendations as discussed.  - Maintain Safety Precautions at all times.  - Attend all medical appointments as scheduled.  - For worsening symptoms: call Primary Care Physician or Nurse Practitioner.  - For emergencies, call 911 or immediately report to the nearest emergency room.      
keep them informed of plan

## 2024-05-24 NOTE — PROGRESS NOTES
"[facility]  Transitional Care Management (TCM) Home Visit    Encounter Provider: Bhavya Williamson   PCP: Kai Toibn MD  Consult Requested By: No ref. provider found  Admit Date: 5/14/24   IP Discharge Date: 5/16/24  Hospital Length of Stay:RRHLOS@ 2 days  Days since discharge (from IP or SNF): 8 days   [facility] Contact Note: 5/17/24  Hospital Diagnosis: S/p cholecystectomy    HISTORY OF PRESENT ILLNESS      Patient ID: Mary Avitia is a 54 y.o. female was recently admitted to the hospital, this is their TCM encounter.    Hospital Course Synopsis:  Admit: 5/14/24  Discharge: 5/16/24  HPI:   Mary Avitia is a 53yo F with a new gallbladder mass discovered on imaging after 2 episodes of self-limited pain last month.  Procedure(s) (LRB):  XI ROBOTIC CHOLECYSTECTOMY, subtotal cholecystectomy (N/A)      With this [facility] hospital follow up visit patient is found at home, ambulating slowly, without difficulty. Reports abdominal incisional pain is lessening each day, has to move slowly. Taking less oxycodone daily (2-3 tablets per day) but is still having some pain. JOSÉ ANTONIO drain is putting out less fluid each day. Appears straw colored today. Reports appetite is good, BM's qd "soft", taking Miralax daily. No nauea, vomiting, fever, chills.    No distress noted. [facility] contact information provided to patient and left in home.      DECISION MAKING TODAY       Assessment & Plan:  1. Thickening of wall of gallbladder with pericholecystic fluid  Assessment & Plan:  Stable, s/p cholecystectomy 5/14/24-Dr. De Leon.  Follow up 5/29/24 post op.      2. S/P cholecystectomy  Overview:  S/p XI ROBOTIC CHOLECYSTECTOMY, subtotal cholecystectomy 5/14/24-Dr. De Leon    Assessment & Plan:  Stable.  Incision without s/s infection/deterioration.  No fever,chills, N/V/D.  RLQ JOSÉ ANTONIO drain producing less drainage daily-straw colored today.  Pain decreasing daily.   Post op follow up " 5/29/24.           Medication List on Discharge:     Medication List            Accurate as of May 24, 2024  2:29 PM. If you have any questions, ask your nurse or doctor.                CONTINUE taking these medications      ALPRAZolam 0.5 MG tablet  Commonly known as: XANAX  Take 1 tablet (0.5 mg total) by mouth 3 (three) times daily as needed for Anxiety.     cyclobenzaprine 10 MG tablet  Commonly known as: FLEXERIL  Uses prn     dicyclomine 20 mg tablet  Commonly known as: BENTYL  Take 20 mg by mouth every 6 (six) hours. As needed.     estradioL 1 MG tablet  Commonly known as: ESTRACE  Take 1 mg by mouth once daily.     FLUoxetine 20 MG capsule  Take 3 capsules (60 mg total) by mouth once daily.     gabapentin 300 MG capsule  Commonly known as: NEURONTIN  Take 1 capsule (300 mg total) by mouth every 8 (eight) hours as needed (neuropathy).     medroxyPROGESTERone 2.5 MG tablet  Commonly known as: PROVERA  Take 2.5 mg by mouth once daily.     oxyCODONE 5 MG immediate release tablet  Commonly known as: ROXICODONE  Take 1 tablet (5 mg total) by mouth every 6 (six) hours as needed for Pain.     polyethylene glycol 17 gram/dose powder  Commonly known as: GLYCOLAX  Take 17 g by mouth once daily. Mix in water or juice     zolpidem 5 MG Tab  Commonly known as: AMBIEN  Take 1 tablet (5 mg total) by mouth nightly as needed (insomnia).              Medication Reconciliation:  Were medications changed on discharge? Yes  Were medications in the home? Yes  Is the patient taking the medications as directed? Yes  Does the patient understand the medications and changes? Yes  Does updated med list accurately reflects meds patient is currently taking? Yes    ENVIRONMENT OF CARE      Family and/or Caregiver present at visit?  No  Name of Caregiver: none  History provided by: patient    Advance Care Planning   Advanced Care Planning Status:  Patient has had an ACP conversation  Living Will: No  Power of : No  [place]:  No    Does Caregiver have HCPoA: No  Changes today: none  Is patient hospice appropriate: No  (If needed, use PPS <30 or FAST score >7)  Was referral to hospice placed: No       Impression upon entering the home:  Physical Dwelling: single family home   Appearance of home environment: cleaniness: clean, walking pathways: clear, lighting: adequate, and home structure: sound structure  Functional Status: minimal assistance  Mobility: ambulatory  Nutritional access: adequate intake and access  Home Health: No, and does not need it at this time   DME/Supplies: none     Diagnostic tests reviewed/disposition: No diagnosic tests pending after this hospitalization.  Disease/illness education:  s/p cholecystectomy  Establishment or re-establishment of referral orders for community resources: No other necessary community resources.   Discussion with other health care providers: No discussion with other health care providers necessary.   Does patient have a PCP at [facility]? Yes   Repatriation plan with PCP? follow-up with PCP within 30d   Does patient have an ostomy (ileostomy, colostomy, suprapubic catheter, nephrostomy tube, tracheostomy, PEG tube, pleurex catheter, cholecystostomy, etc)? No  Were BPAs reviewed? Yes    Social History     Socioeconomic History    Marital status:    Tobacco Use    Smoking status: Never     Passive exposure: Never    Smokeless tobacco: Never   Substance and Sexual Activity    Alcohol use: Yes     Alcohol/week: 5.8 standard drinks of alcohol     Types: 7 Standard drinks or equivalent per week    Drug use: No    Sexual activity: Yes     Partners: Male     Social Determinants of Health     Financial Resource Strain: Low Risk  (5/16/2024)    Overall Financial Resource Strain (CARDIA)     Difficulty of Paying Living Expenses: Not hard at all   Recent Concern: Financial Resource Strain - Medium Risk (5/14/2024)    Overall Financial Resource Strain (CARDIA)     Difficulty of Paying Living Expenses:  "Somewhat hard   Food Insecurity: No Food Insecurity (5/16/2024)    Hunger Vital Sign     Worried About Running Out of Food in the Last Year: Never true     Ran Out of Food in the Last Year: Never true   Transportation Needs: No Transportation Needs (5/16/2024)    TRANSPORTATION NEEDS     Transportation : No   Physical Activity: Inactive (5/16/2024)    Exercise Vital Sign     Days of Exercise per Week: 0 days     Minutes of Exercise per Session: 0 min   Stress: No Stress Concern Present (5/16/2024)    [employer] - Occupational Stress Questionnaire     Feeling of Stress : Not at all   Recent Concern: Stress - Stress Concern Present (5/14/2024)    [employer] - Occupational Stress Questionnaire     Feeling of Stress : Very much   Housing Stability: Low Risk  (5/16/2024)    Housing Stability Vital Sign     Unable to Pay for Housing in the Last Year: No     Homeless in the Last Year: No       OBJECTIVE:     Vital Signs:  Vitals:    05/24/24 1200   BP: 122/70   Pulse: 72   Resp: 20   Temp: 98.7 °F (37.1 °C)       Review of Systems   Constitutional:  Positive for activity change and appetite change. Negative for fatigue and fever.   HENT: Negative.     Eyes: Negative.    Respiratory: Negative.     Cardiovascular: Negative.    Gastrointestinal:  Negative for nausea.        Abdominal soreness at surgical incision  BM daily, "soft"   Endocrine: Negative.    Genitourinary: Negative.    Musculoskeletal: Negative.    Skin: Negative.    Neurological:  Positive for weakness.   Hematological: Negative.    Psychiatric/Behavioral: Negative.         Physical Exam:  Physical Exam  Constitutional:       General: She is not in acute distress.     Appearance: Normal appearance. She is normal weight. She is not ill-appearing.   HENT:      Head: Normocephalic and atraumatic.      Right Ear: External ear normal.      Left Ear: External ear normal.      Nose: Nose normal.      " Mouth/Throat:      Mouth: Mucous membranes are dry.      Pharynx: Oropharynx is clear.   Eyes:      Extraocular Movements: Extraocular movements intact.      Conjunctiva/sclera: Conjunctivae normal.      Pupils: Pupils are equal, round, and reactive to light.   Cardiovascular:      Rate and Rhythm: Normal rate and regular rhythm.      Pulses: Normal pulses.      Heart sounds: Normal heart sounds.   Pulmonary:      Effort: Pulmonary effort is normal.      Breath sounds: Normal breath sounds.   Abdominal:      General: Abdomen is flat. Bowel sounds are normal. There is no distension.      Palpations: Abdomen is soft.      Tenderness: There is abdominal tenderness.   Musculoskeletal:         General: Normal range of motion.      Cervical back: Normal range of motion and neck supple.   Skin:     General: Skin is warm and dry.      Capillary Refill: Capillary refill takes 2 to 3 seconds.   Neurological:      Mental Status: She is alert and oriented to person, place, and time.      Motor: Weakness present.      Gait: Gait normal.   Psychiatric:         Mood and Affect: Mood normal.         Behavior: Behavior normal.         Thought Content: Thought content normal.         Judgment: Judgment normal.           INSTRUCTIONS FOR PATIENT:     Scheduled Follow-up, Appts Reviewed with Modifications if Needed: Yes  Future Appointments   Date Time Provider Department Center   5/29/2024 10:30 AM Gretchen Hitchcock NP [facility] [place]   6[facility]24/2024  8[facility] [place]       Signature: Bhavya Williamson NP    Transition of Care Visit:  I have reviewed and updated the history and problem list.  I have reconciled the medication list.  I have discussed the hospitalization and current medical issues, prognosis and plans with the patient/family.

## 2024-05-24 NOTE — ASSESSMENT & PLAN NOTE
Stable.  Incision without s/s infection/deterioration.  No fever,chills, N/V/D.  RLQ JOSÉ ANTONIO drain producing less drainage daily-straw colored today.  Pain decreasing daily.   Post op follow up 5/29/24.

## 2024-05-24 NOTE — TELEPHONE ENCOUNTER
Dr Juarez, Dr Tobin are both out today and Cyndee can not refill, last seen by PCP 1/9/24 and Dr Juarez prescribed at virtual on 5/14/24, will you refill?  Thank you

## 2024-05-29 ENCOUNTER — TELEPHONE (OUTPATIENT)
Dept: SURGERY | Facility: CLINIC | Age: 54
End: 2024-05-29
Payer: COMMERCIAL

## 2024-05-29 ENCOUNTER — OFFICE VISIT (OUTPATIENT)
Dept: SURGERY | Facility: CLINIC | Age: 54
End: 2024-05-29
Payer: COMMERCIAL

## 2024-05-29 VITALS
WEIGHT: 125.88 LBS | OXYGEN SATURATION: 97 % | HEIGHT: 64 IN | SYSTOLIC BLOOD PRESSURE: 115 MMHG | HEART RATE: 82 BPM | DIASTOLIC BLOOD PRESSURE: 69 MMHG | BODY MASS INDEX: 21.49 KG/M2

## 2024-05-29 DIAGNOSIS — K82.8 THICKENING OF WALL OF GALLBLADDER WITH PERICHOLECYSTIC FLUID: Primary | ICD-10-CM

## 2024-05-29 DIAGNOSIS — Z90.49 S/P CHOLECYSTECTOMY: ICD-10-CM

## 2024-05-29 PROCEDURE — 3078F DIAST BP <80 MM HG: CPT | Mod: CPTII,S$GLB,,

## 2024-05-29 PROCEDURE — 3074F SYST BP LT 130 MM HG: CPT | Mod: CPTII,S$GLB,,

## 2024-05-29 PROCEDURE — 99024 POSTOP FOLLOW-UP VISIT: CPT | Mod: S$GLB,,,

## 2024-05-29 PROCEDURE — 99999 PR PBB SHADOW E&M-EST. PATIENT-LVL III: CPT | Mod: PBBFAC,,,

## 2024-05-29 NOTE — LETTER
May 31, 2024      [facility] - Surgery  [address] [place] [address]  Phone: [phone]       Patient: Mary Avitia  YOB: 1970  Date of Visit: 05/31/2024    To Whom It May Concern:    Mary Avitia was seen on 5/29/24. She may return to work/school on 5/31/24 with lifting  restrictions of less than 10 lbs until 6/25/24. If you have any questions or concerns, or if I can be of further assistance, please do not hesitate to contact my office.    Sincerely,    Gretchen Hitchcock NP

## 2024-05-29 NOTE — TELEPHONE ENCOUNTER
----- Message from Holly Meredith sent at 5/29/2024 10:30 AM CDT -----  Regarding: pta benitezice  Contact: [phone]  Pt calling running 10 min late for appt 5/29/24, pls call

## 2024-06-08 NOTE — TELEPHONE ENCOUNTER
No care due was identified.  [facility] Embedded Care Due Messages. Reference number: [id number].   6/07/2024 7:12:25 PM CDT

## 2024-06-10 ENCOUNTER — PATIENT MESSAGE (OUTPATIENT)
Dept: FAMILY MEDICINE | Facility: CLINIC | Age: 54
End: 2024-06-10
Payer: COMMERCIAL

## 2024-06-10 RX ORDER — ALPRAZOLAM 0.5 MG/1
0.5 TABLET ORAL 3 TIMES DAILY PRN
Qty: 20 TABLET | Refills: 0 | OUTPATIENT
Start: 2024-06-10

## 2024-06-10 RX ORDER — ALPRAZOLAM 0.5 MG/1
0.5 TABLET ORAL 3 TIMES DAILY PRN
Qty: 10 TABLET | Refills: 0 | Status: SHIPPED | OUTPATIENT
Start: 2024-06-10 | End: 2024-06-13 | Stop reason: SDUPTHER

## 2024-06-10 NOTE — TELEPHONE ENCOUNTER
I sent in a prescription for a few Xanax.  Please have her follow-up appointment with me if continued problems as I do not think this was intended to be a permanent medication.

## 2024-06-10 NOTE — TELEPHONE ENCOUNTER
No care due was identified.  [facility] Embedded Care Due Messages. Reference number: [id number].   6/10/2024 8:40:07 AM CDT

## 2024-06-13 RX ORDER — ALPRAZOLAM 0.5 MG/1
0.5 TABLET ORAL 3 TIMES DAILY PRN
Qty: 10 TABLET | Refills: 0 | Status: SHIPPED | OUTPATIENT
Start: 2024-06-13

## 2024-06-13 NOTE — TELEPHONE ENCOUNTER
No care due was identified.  [facility] Embedded Care Due Messages. Reference number: [id number].   6/13/2024 3:27:01 PM CDT

## 2024-06-13 NOTE — TELEPHONE ENCOUNTER
----- Message from Naeem Kumari sent at 6/13/2024  3:19 PM CDT -----  Pt needs refill for ALPRAZolam (XANAX) 0.5 MG tablet. Pt can be reached at [phone].          [facility]/pharmacy #[id number] - [place], [place] - [address]  [address] [address]  Phone: [phone] Fax: [phone]          Thanks  DEANN

## 2024-06-19 ENCOUNTER — TELEPHONE (OUTPATIENT)
Dept: HEMATOLOGY/ONCOLOGY | Facility: CLINIC | Age: 54
End: 2024-06-19
Payer: COMMERCIAL

## 2024-06-24 ENCOUNTER — LAB VISIT (OUTPATIENT)
Dept: LAB | Facility: HOSPITAL | Age: 54
End: 2024-06-24
Attending: FAMILY MEDICINE
Payer: COMMERCIAL

## 2024-06-24 DIAGNOSIS — D75.839 THROMBOCYTOSIS: ICD-10-CM

## 2024-06-24 DIAGNOSIS — D64.9 ANEMIA, UNSPECIFIED TYPE: ICD-10-CM

## 2024-06-24 LAB
BASOPHILS # BLD AUTO: 0.04 K/UL (ref 0–0.2)
BASOPHILS NFR BLD: 0.7 % (ref 0–1.9)
DIFFERENTIAL METHOD BLD: ABNORMAL
EOSINOPHIL # BLD AUTO: 0.2 K/UL (ref 0–0.5)
EOSINOPHIL NFR BLD: 2.7 % (ref 0–8)
ERYTHROCYTE [DISTWIDTH] IN BLOOD BY AUTOMATED COUNT: 14.8 % (ref 11.5–14.5)
FERRITIN SERPL-MCNC: 46 NG/ML (ref 20–300)
FOLATE SERPL-MCNC: 9.4 NG/ML (ref 4–24)
HCT VFR BLD AUTO: 37.7 % (ref 37–48.5)
HGB BLD-MCNC: 12 G/DL (ref 12–16)
IMM GRANULOCYTES # BLD AUTO: 0.01 K/UL (ref 0–0.04)
IMM GRANULOCYTES NFR BLD AUTO: 0.2 % (ref 0–0.5)
IRON SERPL-MCNC: 56 UG/DL (ref 30–160)
LYMPHOCYTES # BLD AUTO: 1.1 K/UL (ref 1–4.8)
LYMPHOCYTES NFR BLD: 19.3 % (ref 18–48)
MCH RBC QN AUTO: 29.1 PG (ref 27–31)
MCHC RBC AUTO-ENTMCNC: 31.8 G/DL (ref 32–36)
MCV RBC AUTO: 91 FL (ref 82–98)
MONOCYTES # BLD AUTO: 0.4 K/UL (ref 0.3–1)
MONOCYTES NFR BLD: 6.2 % (ref 4–15)
NEUTROPHILS # BLD AUTO: 4.1 K/UL (ref 1.8–7.7)
NEUTROPHILS NFR BLD: 70.9 % (ref 38–73)
NRBC BLD-RTO: 0 /100 WBC
PLATELET # BLD AUTO: 274 K/UL (ref 150–450)
PMV BLD AUTO: 9.8 FL (ref 9.2–12.9)
RBC # BLD AUTO: 4.13 M/UL (ref 4–5.4)
RETICS/RBC NFR AUTO: 1.3 % (ref 0.5–2.5)
SATURATED IRON: 17 % (ref 20–50)
TOTAL IRON BINDING CAPACITY: 323 UG/DL (ref 250–450)
TRANSFERRIN SERPL-MCNC: 218 MG/DL (ref 200–375)
VIT B12 SERPL-MCNC: 396 PG/ML (ref 210–950)
WBC # BLD AUTO: 5.84 K/UL (ref 3.9–12.7)

## 2024-06-24 PROCEDURE — 85045 AUTOMATED RETICULOCYTE COUNT: CPT | Performed by: FAMILY MEDICINE

## 2024-06-24 PROCEDURE — 85025 COMPLETE CBC W/AUTO DIFF WBC: CPT | Performed by: FAMILY MEDICINE

## 2024-06-24 PROCEDURE — 82746 ASSAY OF FOLIC ACID SERUM: CPT | Performed by: FAMILY MEDICINE

## 2024-06-24 PROCEDURE — 83540 ASSAY OF IRON: CPT | Performed by: FAMILY MEDICINE

## 2024-06-24 PROCEDURE — 36415 COLL VENOUS BLD VENIPUNCTURE: CPT | Mod: PO | Performed by: FAMILY MEDICINE

## 2024-06-24 PROCEDURE — 82607 VITAMIN B-12: CPT | Performed by: FAMILY MEDICINE

## 2024-06-24 PROCEDURE — 82728 ASSAY OF FERRITIN: CPT | Performed by: FAMILY MEDICINE

## 2024-07-19 NOTE — PROGRESS NOTES
"  Post-Op Follow-up Visit:   5/29/2024  Patient ID: Mary Avitia is a 54 y.o. female, born 1970    Chief Complaint   Patient presents with    Post-op Evaluation     Interval History: Mrs. Avitia returns to the clinic for a post operative follow up after robotic cholecystectomy with Dr. De Leon on 5/14/24. She is doing well. Has some soreness that is controlled with OTC medications. Tolerating a regular diet without nausea or vomiting. Having bowel function. Denies fever, chills, N/V/D, SOB, chest pain, redness, drainage.     Physical Exam:  /69 (BP Location: Left arm, Patient Position: Sitting)   Pulse 82   Ht 5' 4" (1.626 m)   Wt 57.1 kg (125 lb 14.1 oz)   LMP 11/05/2020   SpO2 97%   BMI 21.61 kg/m²     General:  Non-toxic, ambulatory  Abd:  Soft, non-tender  Incision:  trocar sites CDI without redness, swelling, drainage. Abd is soft, non tender, non distended.     Pathology:  Final Pathologic Diagnosis   1. LYMPH NODE, COMMON HEPATIC ARTERY, EXCISION (FS1A):  - One lymph node, negative for malignancy, 0/1    2. OMENTUM, BIOPSY (FS2A):  - Two lymph nodes, negative for malignancy, 0/2  - Fibroadipose tissue with mixed acute and chronic inflammation    3. LYMPH NODES, INTRA-ABDOMINAL, EXCISION (FS3A):  - Three lymph nodes, negative for malignancy, 0/3    4. LYMPH NODE, 'ADDITIONAL', EXCISION (FS4A):  - One lymph node, negative for malignancy, 0/1    5. GALLBLADDER, WALL, EXCISION (ZD6X-NU7D):  - Xanthogranulomatous cholecystitis  - Fibrous serosal adhesions  - No evidence of malignancy    6. 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 Thickening of wall of gallbladder with pericholecystic fluid  K82.8 575.8       2. S/P cholecystectomy  Z90.49 V45.79       Plan   Mrs. Avitia returns to the clinic for a post operative follow up after robotic " cholecystectomy with Dr. De Leon on 5/14/24. She is doing well. Has some soreness that is controlled with OTC medications. Tolerating a regular diet without nausea or vomiting. Having bowel function. On exam her trocar sites are CDI without redness, swelling, drainage. Abd is soft, non tender, non distended. Pathology reviewed- no evidence of malignancy. Reviewed lifting restrictions and care of the incisions. Return to work letter provided. Questions were asked and answered to patient's satisfaction.  We discussed the need for continued clinical/radiographic/endoscopic follow-up.     Follow up if symptoms worsen or fail to improve.         TOMASZ Hodgson, FNP-C  Upper GI / Hepatobiliary Surgical Oncology  [facility] [place], [place]  Office: [phone]  Fax: [phone]

## 2024-08-08 ENCOUNTER — HOSPITAL ENCOUNTER (OUTPATIENT)
Dept: RADIOLOGY | Facility: HOSPITAL | Age: 54
Discharge: HOME | End: 2024-08-08
Attending: FAMILY MEDICINE
Payer: COMMERCIAL

## 2024-08-08 DIAGNOSIS — Z12.31 OTHER SCREENING MAMMOGRAM: ICD-10-CM

## 2024-08-08 PROCEDURE — 77063 BREAST TOMOSYNTHESIS BI: CPT | Mod: 26,,, | Performed by: RADIOLOGY

## 2024-08-08 PROCEDURE — 77067 SCR MAMMO BI INCL CAD: CPT | Mod: TC,PO

## 2024-08-08 PROCEDURE — 77067 SCR MAMMO BI INCL CAD: CPT | Mod: 26,,, | Performed by: RADIOLOGY

## 2024-08-09 ENCOUNTER — PATIENT MESSAGE (OUTPATIENT)
Dept: FAMILY MEDICINE | Facility: CLINIC | Age: 54
End: 2024-08-09
Payer: COMMERCIAL

## 2025-01-29 ENCOUNTER — PATIENT MESSAGE (OUTPATIENT)
Dept: FAMILY MEDICINE | Facility: CLINIC | Age: 55
End: 2025-01-29
Payer: COMMERCIAL

## 2025-01-29 RX ORDER — FLUOXETINE HYDROCHLORIDE 20 MG/1
60 CAPSULE ORAL
Qty: 270 CAPSULE | Refills: 0 | Status: SHIPPED | OUTPATIENT
Start: 2025-01-29

## 2025-01-29 NOTE — TELEPHONE ENCOUNTER
No care due was identified.  [facility] Embedded Care Due Messages. Reference number: [id number].   1/29/2025 12:10:10 AM CST

## 2025-04-28 NOTE — PROGRESS NOTES
Physical exam, Followup on depression -request refill on Prozac. She has been on this for several years for depression symptoms. She has been doing well.. no suicidal or homicidal ideation.  She did get started on hormone replacement therapy by her gynecologist.  States Pap smear up-to-date, mammogram up-to-date.  Estrogen noted on her medication list.  She states she is taking progesterone as well, but not sure what type-will contact pharmacy to confirm an add to medication list. She is on chronic pain medication from a pain management doctor due to neck and back problems, apparently decreasing medication.  She states that her pain management doctor that she had been seeing as retiring so she is changing to another clinic.  She is interested in some injections.    Past Medical History:  Past Medical History:   Diagnosis Date    Allergy     Chronic back pain 11/15/2018    Chronic neck pain     Chronically on opiate therapy     Depression     Kidney stone      Past Surgical History:   Procedure Laterality Date    LEEP       Social History     Socioeconomic History    Marital status:      Spouse name: Not on file    Number of children: Not on file    Years of education: Not on file    Highest education level: Not on file   Social Needs    Financial resource strain: Not on file    Food insecurity - worry: Not on file    Food insecurity - inability: Not on file    Transportation needs - medical: Not on file    Transportation needs - non-medical: Not on file   Occupational History    Not on file   Tobacco Use    Smoking status: Never Smoker   Substance and Sexual Activity    Alcohol use: Yes     Alcohol/week: 3.5 oz     Types: 7 drink(s) per week    Drug use: No    Sexual activity: Not on file   Other Topics Concern    Not on file   Social History Narrative    Not on file     No family history on file.  Allergies   Allergen Reactions    No Known Drug Allergies      Current Outpatient  "Medications on File Prior to Visit   Medication Sig Dispense Refill    estradiol (ESTRACE) 1 MG tablet Take 1 mg by mouth once daily.  6    oxyCODONE (OXYCONTIN) 10 mg 12 hr tablet Take 10 mg by mouth every 12 (twelve) hours as needed.      oxyCODONE-acetaminophen (PERCOCET)  mg per tablet Take 1 tablet by mouth 3 (three) times daily as needed.      zolpidem (AMBIEN) 10 mg Tab Take 10 mg by mouth nightly as needed.      [DISCONTINUED] fluoxetine (PROZAC) 20 MG capsule Take 3 capsules (60 mg total) by mouth once daily. 90 capsule 11    [DISCONTINUED] oxycodone (OXYCONTIN) 60 mg TR12 12 hr tablet Take 10 mg by mouth 4 (four) times daily.       [DISCONTINUED] carisoprodol (SOMA) 350 MG tablet Take 350 mg by mouth once daily.       No current facility-administered medications on file prior to visit.            ROS:  GENERAL: No fever, chills,  or significant weight changes.  HEENT: No headache or hearing complaints.  No dysphagia  Eyes: No vision complaints  CHEST: Denies GLEASON, cyanosis, wheezing, cough and sputum production.  CARDIOVASCULAR: Denies chest pain, PND, orthopnea or reduced exercise tolerance.  ABDOMEN: Appetite fine. Denies diarrhea, abdominal pain, hematemesis or blood in stool.  URINARY: No flank pain, dysuria or hematuria.  MUSCULOSKELETAL: No warmth swelling or tenderness of the joints.  She does get some neck and back pain.  Occasional radiation to the leg.  NEUROLOGIC: No focal weakness.  PSYCHIATRIC: As above.  No SI/HI        OBJECTIVE:     Vitals:    11/15/18 1032   BP: 120/80   Pulse: 80   Temp: 98.3 °F (36.8 °C)   Weight: 57.5 kg (126 lb 12.8 oz)   Height: 5' 4" (1.626 m)     Wt Readings from Last 3 Encounters:   11/15/18 57.5 kg (126 lb 12.8 oz)   09/27/17 55.8 kg (123 lb)   04/01/16 58.2 kg (128 lb 6.4 oz)     APPEARANCE: Well nourished, well developed, in no acute distress.    HEAD: Normocephalic.  Atraumatic.  No sinus tenderness.  EYES:   Right eye: Pupil reactive.  Conjunctiva " clear.    Left eye: Pupil reactive.  Conjunctiva clear.    Both fundi:  Grossly normal to nondilated exam. EOMI.    EARS: TM's intact. Light reflex normal. No retraction or perforation.    NOSE:  clear.  MOUTH & THROAT:  No pharyngeal erythema or exudate. No lesions.  NECK: Supple. No bruits.  No JVD.  No cervical lymphadenopathy.  No thyromegaly.    CHEST: Breath sounds clear bilaterally.  Normal respiratory effort  CARDIOVASCULAR: Normal rate.  Regular rhythm.  No murmurs.  No rub.  No gallops.  ABDOMEN: Bowel sounds normal.  Soft.  No tenderness.  No organomegaly.  PERIPHERAL VASCULAR: No cyanosis.  No clubbing.  No edema.  NEUROLOGIC: No focal findings.  MENTAL STATUS: Alert.  Oriented x 3.        Kya was seen today for annual exam.    Diagnoses and all orders for this visit:    Routine history and physical examination of adult  -     Comprehensive metabolic panel; Future  -     Lipid panel; Future    Recurrent major depressive disorder, in full remission    Chronic neck pain  -     Ambulatory referral to Pain Clinic    Chronically on opiate therapy  -     Ambulatory referral to Pain Clinic    Chronic low back pain with left-sided sciatica, unspecified back pain laterality  -     Ambulatory referral to Pain Clinic    Other orders  -     FLUoxetine (PROZAC) 20 MG capsule; Take 3 capsules (60 mg total) by mouth once daily.     Request report from recent immunizations at work  Continue current medication.  Will contact pharmacy to update her medication list with the progesterone.  Keep follow-up pain management, she is changing to new clinic..  Would encourage continued decrease in opioid use.  Request report from last Pap smear and mammogram.      Anticipatory guidance: Don't smoke.  Healthy diet and regular exercise recommended.       done

## 2025-04-29 RX ORDER — FLUOXETINE HYDROCHLORIDE 20 MG/1
60 CAPSULE ORAL
Qty: 270 CAPSULE | Refills: 0 | Status: SHIPPED | OUTPATIENT
Start: 2025-04-29

## 2025-04-29 NOTE — TELEPHONE ENCOUNTER
Care Due:                  Date            Visit Type   Department     Provider  --------------------------------------------------------------------------------                                ESTABLISHED                              PATIENT -    [facility]  Last Visit: 05-      [employer]      TESHA Juarez  Next Visit: None Scheduled  None         None Found                                                            Last  Test          Frequency    Reason                     Performed    Due Date  --------------------------------------------------------------------------------    Office Visit  12 months..  polyethylene.............  05- 05-    [facility] Embedded Care Due Messages. Reference number: [id number].   4/29/2025 12:08:53 AM CDT

## 2025-06-23 ENCOUNTER — PATIENT OUTREACH (OUTPATIENT)
Dept: ADMINISTRATIVE | Facility: HOSPITAL | Age: 55
End: 2025-06-23
Payer: COMMERCIAL

## 2025-07-31 RX ORDER — FLUOXETINE 20 MG/1
60 CAPSULE ORAL
Qty: 60 CAPSULE | Refills: 0 | Status: SHIPPED | OUTPATIENT
Start: 2025-07-31

## 2025-07-31 NOTE — TELEPHONE ENCOUNTER
Care Due:                  Date            Visit Type   Department     Provider  --------------------------------------------------------------------------------                                ESTABLISHED                              PATIENT -    [facility]  Last Visit: 05-      [facility]      TSEHA Juarez  Next Visit: None Scheduled  None         None Found                                                            Last  Test          Frequency    Reason                     Performed    Due Date  --------------------------------------------------------------------------------    Office Visit  12 months..  FLUoxetine, polyethylene.  05- 05-    [facility] Embedded Care Due Messages. Reference number: [id number].   7/31/2025 12:08:25 AM ROB

## (undated) DEVICE — TRAY MINOR GEN SURG OMC

## (undated) DEVICE — SEALER LIGASURE MARYLAND 23CM

## (undated) DEVICE — DRAPE STERI INSTRUMENT 1018

## (undated) DEVICE — DRAPE ABDOMINAL TIBURON 14X11

## (undated) DEVICE — SPONGE LAP 4X18 PREWASHED

## (undated) DEVICE — GAUZE SPONGE PEANUT STRL

## (undated) DEVICE — OBTURATOR BLADELESS 8MM XI

## (undated) DEVICE — SUT 0 VICRYL / UR6 (J603)

## (undated) DEVICE — SUT PROLENE 4-0 RB-1 BL MO

## (undated) DEVICE — SUT 3-0 VICRYL / SH (J416)

## (undated) DEVICE — SUT VICRYL+ 27 UR-6 VIOL

## (undated) DEVICE — ADHESIVE DERMABOND ADVANCED

## (undated) DEVICE — DRAPE COLUMN DAVINCI XI

## (undated) DEVICE — SOL ELECTROLUBE ANTI-STIC

## (undated) DEVICE — SYS SEE SHARP SCP ANTIFG LNG

## (undated) DEVICE — DEVICE SYNCHROSEAL DA VINCI

## (undated) DEVICE — ELECTRODE REM PLYHSV RETURN 9

## (undated) DEVICE — DRAPE SCOPE PILLOW WARMER

## (undated) DEVICE — DRAPE ARM DAVINCI XI

## (undated) DEVICE — COVER PROXIMA MAYO STAND

## (undated) DEVICE — CLIP HEMO-LOK ML

## (undated) DEVICE — NDL MONOPTY BIOPSY 14GX10CM

## (undated) DEVICE — COVER TIP CURVED SCISSORS XI

## (undated) DEVICE — SUT 2-0 12-18IN SILK

## (undated) DEVICE — SUT ABS CLIP LAPRA-TY CTD

## (undated) DEVICE — ELECTRODE BLADE INSULATED 1 IN

## (undated) DEVICE — SUT 2/0 30IN SILK BLK BRAI

## (undated) DEVICE — TROCAR ENDOPATH XCEL 5MM 7.5CM

## (undated) DEVICE — SUT MONOCRYL 4-0 PS-2

## (undated) DEVICE — SUT PROLENE 3-0 SH DA 36 BL

## (undated) DEVICE — BLADE SURG CARBON STEEL SZ11

## (undated) DEVICE — SEAL UNIVERSAL 5MM-8MM XI

## (undated) DEVICE — TUBE SET SINGLE LUMEN FILTERED

## (undated) DEVICE — PAD PINK TRENDELENBURG POS XL